# Patient Record
Sex: FEMALE | Race: WHITE
[De-identification: names, ages, dates, MRNs, and addresses within clinical notes are randomized per-mention and may not be internally consistent; named-entity substitution may affect disease eponyms.]

---

## 2017-08-28 ENCOUNTER — PRIOR ORIGINAL RECORDS (OUTPATIENT)
Dept: OTHER | Age: 62
End: 2017-08-28

## 2017-08-29 ENCOUNTER — MYAURORA ACCOUNT LINK (OUTPATIENT)
Dept: OTHER | Age: 62
End: 2017-08-29

## 2017-08-29 ENCOUNTER — PRIOR ORIGINAL RECORDS (OUTPATIENT)
Dept: OTHER | Age: 62
End: 2017-08-29

## 2017-09-05 ENCOUNTER — PRIOR ORIGINAL RECORDS (OUTPATIENT)
Dept: OTHER | Age: 62
End: 2017-09-05

## 2017-09-14 ENCOUNTER — HOSPITAL ENCOUNTER (OUTPATIENT)
Dept: CV DIAGNOSTICS | Facility: HOSPITAL | Age: 62
Discharge: HOME | End: 2017-09-14
Attending: INTERNAL MEDICINE
Payer: COMMERCIAL

## 2017-09-14 DIAGNOSIS — R07.9 CHEST PAIN: ICD-10-CM

## 2017-09-14 DIAGNOSIS — R06.00 DYSPNEA, UNSPECIFIED TYPE: ICD-10-CM

## 2017-09-14 PROCEDURE — 93350 STRESS TTE ONLY: CPT | Performed by: INTERNAL MEDICINE

## 2017-09-14 PROCEDURE — 93018 CV STRESS TEST I&R ONLY: CPT | Performed by: INTERNAL MEDICINE

## 2017-09-14 PROCEDURE — 93017 CV STRESS TEST TRACING ONLY: CPT | Performed by: INTERNAL MEDICINE

## 2017-10-04 ENCOUNTER — PRIOR ORIGINAL RECORDS (OUTPATIENT)
Dept: OTHER | Age: 62
End: 2017-10-04

## 2017-10-19 ENCOUNTER — PRIOR ORIGINAL RECORDS (OUTPATIENT)
Dept: OTHER | Age: 62
End: 2017-10-19

## 2018-12-06 ENCOUNTER — PRIOR ORIGINAL RECORDS (OUTPATIENT)
Dept: OTHER | Age: 63
End: 2018-12-06

## 2018-12-06 ENCOUNTER — MYAURORA ACCOUNT LINK (OUTPATIENT)
Dept: OTHER | Age: 63
End: 2018-12-06

## 2019-02-28 VITALS
HEIGHT: 69 IN | WEIGHT: 133 LBS | BODY MASS INDEX: 19.7 KG/M2 | DIASTOLIC BLOOD PRESSURE: 98 MMHG | SYSTOLIC BLOOD PRESSURE: 138 MMHG | HEART RATE: 80 BPM

## 2019-02-28 VITALS
WEIGHT: 135 LBS | SYSTOLIC BLOOD PRESSURE: 172 MMHG | DIASTOLIC BLOOD PRESSURE: 90 MMHG | BODY MASS INDEX: 20.46 KG/M2 | HEIGHT: 68 IN | HEART RATE: 60 BPM

## 2019-02-28 VITALS
HEART RATE: 58 BPM | DIASTOLIC BLOOD PRESSURE: 78 MMHG | WEIGHT: 136 LBS | HEIGHT: 68 IN | SYSTOLIC BLOOD PRESSURE: 142 MMHG | BODY MASS INDEX: 20.61 KG/M2

## 2019-02-28 VITALS
HEART RATE: 76 BPM | HEIGHT: 69 IN | SYSTOLIC BLOOD PRESSURE: 140 MMHG | WEIGHT: 145 LBS | BODY MASS INDEX: 21.48 KG/M2 | DIASTOLIC BLOOD PRESSURE: 84 MMHG

## 2019-05-30 ENCOUNTER — APPOINTMENT (OUTPATIENT)
Dept: CV DIAGNOSTICS | Facility: HOSPITAL | Age: 64
End: 2019-05-30
Attending: STUDENT IN AN ORGANIZED HEALTH CARE EDUCATION/TRAINING PROGRAM
Payer: COMMERCIAL

## 2019-05-30 ENCOUNTER — APPOINTMENT (OUTPATIENT)
Dept: GENERAL RADIOLOGY | Facility: HOSPITAL | Age: 64
End: 2019-05-30
Attending: EMERGENCY MEDICINE
Payer: COMMERCIAL

## 2019-05-30 ENCOUNTER — HOSPITAL ENCOUNTER (OUTPATIENT)
Facility: HOSPITAL | Age: 64
Setting detail: OBSERVATION
Discharge: HOME | End: 2019-05-31
Attending: EMERGENCY MEDICINE | Admitting: HOSPITALIST
Payer: COMMERCIAL

## 2019-05-30 ENCOUNTER — APPOINTMENT (OUTPATIENT)
Dept: CT IMAGING | Facility: HOSPITAL | Age: 64
End: 2019-05-30
Attending: EMERGENCY MEDICINE
Payer: COMMERCIAL

## 2019-05-30 DIAGNOSIS — R07.89 CHEST PAIN, ATYPICAL: Primary | ICD-10-CM

## 2019-05-30 DIAGNOSIS — I10 HYPERTENSION, UNSPECIFIED TYPE: ICD-10-CM

## 2019-05-30 PROBLEM — R73.9 HYPERGLYCEMIA: Status: ACTIVE | Noted: 2019-05-30

## 2019-05-30 PROBLEM — R79.89 AZOTEMIA: Status: ACTIVE | Noted: 2019-05-30

## 2019-05-30 PROCEDURE — 99219 INITIAL OBSERVATION CARE,LEVL II: CPT | Performed by: STUDENT IN AN ORGANIZED HEALTH CARE EDUCATION/TRAINING PROGRAM

## 2019-05-30 PROCEDURE — 71045 X-RAY EXAM CHEST 1 VIEW: CPT | Performed by: EMERGENCY MEDICINE

## 2019-05-30 PROCEDURE — 71275 CT ANGIOGRAPHY CHEST: CPT | Performed by: EMERGENCY MEDICINE

## 2019-05-30 PROCEDURE — 93306 TTE W/DOPPLER COMPLETE: CPT | Performed by: STUDENT IN AN ORGANIZED HEALTH CARE EDUCATION/TRAINING PROGRAM

## 2019-05-30 RX ORDER — SODIUM PHOSPHATE, DIBASIC AND SODIUM PHOSPHATE, MONOBASIC 7; 19 G/133ML; G/133ML
1 ENEMA RECTAL ONCE AS NEEDED
Status: DISCONTINUED | OUTPATIENT
Start: 2019-05-30 | End: 2019-05-31

## 2019-05-30 RX ORDER — METOPROLOL SUCCINATE 50 MG/1
50 TABLET, EXTENDED RELEASE ORAL
Status: DISCONTINUED | OUTPATIENT
Start: 2019-05-31 | End: 2019-05-31

## 2019-05-30 RX ORDER — ACETAMINOPHEN 325 MG/1
650 TABLET ORAL EVERY 6 HOURS PRN
Status: DISCONTINUED | OUTPATIENT
Start: 2019-05-30 | End: 2019-05-31

## 2019-05-30 RX ORDER — COLCHICINE 0.6 MG/1
1.2 TABLET ORAL ONCE
Status: COMPLETED | OUTPATIENT
Start: 2019-05-30 | End: 2019-05-30

## 2019-05-30 RX ORDER — METOCLOPRAMIDE HYDROCHLORIDE 5 MG/ML
10 INJECTION INTRAMUSCULAR; INTRAVENOUS EVERY 8 HOURS PRN
Status: DISCONTINUED | OUTPATIENT
Start: 2019-05-30 | End: 2019-05-31

## 2019-05-30 RX ORDER — ASPIRIN 81 MG/1
324 TABLET, CHEWABLE ORAL ONCE
Status: COMPLETED | OUTPATIENT
Start: 2019-05-30 | End: 2019-05-30

## 2019-05-30 RX ORDER — HYDRALAZINE HYDROCHLORIDE 20 MG/ML
5 INJECTION INTRAMUSCULAR; INTRAVENOUS EVERY 6 HOURS PRN
Status: DISCONTINUED | OUTPATIENT
Start: 2019-05-30 | End: 2019-05-31

## 2019-05-30 RX ORDER — ONDANSETRON 2 MG/ML
4 INJECTION INTRAMUSCULAR; INTRAVENOUS EVERY 6 HOURS PRN
Status: DISCONTINUED | OUTPATIENT
Start: 2019-05-30 | End: 2019-05-31

## 2019-05-30 RX ORDER — NITROGLYCERIN 0.4 MG/1
0.4 TABLET SUBLINGUAL EVERY 5 MIN PRN
Status: DISCONTINUED | OUTPATIENT
Start: 2019-05-30 | End: 2019-05-31

## 2019-05-30 RX ORDER — ASPIRIN 325 MG
325 TABLET, DELAYED RELEASE (ENTERIC COATED) ORAL DAILY
Status: DISCONTINUED | OUTPATIENT
Start: 2019-05-31 | End: 2019-05-31

## 2019-05-30 RX ORDER — ASPIRIN 325 MG
325 TABLET ORAL DAILY
Status: DISCONTINUED | OUTPATIENT
Start: 2019-05-30 | End: 2019-05-30 | Stop reason: SDUPTHER

## 2019-05-30 RX ORDER — LISINOPRIL 40 MG/1
40 TABLET ORAL DAILY
Status: DISCONTINUED | OUTPATIENT
Start: 2019-05-30 | End: 2019-05-30

## 2019-05-30 RX ORDER — AMOXICILLIN AND CLAVULANATE POTASSIUM 875; 125 MG/1; MG/1
1 TABLET, FILM COATED ORAL 2 TIMES DAILY
Status: DISCONTINUED | OUTPATIENT
Start: 2019-05-30 | End: 2019-05-31

## 2019-05-30 RX ORDER — LISINOPRIL 40 MG/1
40 TABLET ORAL DAILY
Status: DISCONTINUED | OUTPATIENT
Start: 2019-05-31 | End: 2019-05-31

## 2019-05-30 RX ORDER — AMOXICILLIN AND CLAVULANATE POTASSIUM 875; 125 MG/1; MG/1
1 TABLET, FILM COATED ORAL 2 TIMES DAILY
COMMUNITY
Start: 2019-05-23 | End: 2019-06-01

## 2019-05-30 RX ORDER — COLCHICINE 0.6 MG/1
0.6 TABLET ORAL DAILY
Status: DISCONTINUED | OUTPATIENT
Start: 2019-05-31 | End: 2019-05-30

## 2019-05-30 RX ORDER — SODIUM CHLORIDE 9 MG/ML
INJECTION, SOLUTION INTRAVENOUS CONTINUOUS
Status: DISCONTINUED | OUTPATIENT
Start: 2019-05-30 | End: 2019-05-30

## 2019-05-30 RX ORDER — POLYETHYLENE GLYCOL 3350 17 G/17G
17 POWDER, FOR SOLUTION ORAL DAILY PRN
Status: DISCONTINUED | OUTPATIENT
Start: 2019-05-30 | End: 2019-05-31

## 2019-05-30 RX ORDER — ENOXAPARIN SODIUM 100 MG/ML
40 INJECTION SUBCUTANEOUS DAILY
Status: DISCONTINUED | OUTPATIENT
Start: 2019-05-30 | End: 2019-05-31

## 2019-05-30 RX ORDER — ONDANSETRON 2 MG/ML
4 INJECTION INTRAMUSCULAR; INTRAVENOUS EVERY 4 HOURS PRN
Status: DISCONTINUED | OUTPATIENT
Start: 2019-05-30 | End: 2019-05-30

## 2019-05-30 RX ORDER — METOPROLOL SUCCINATE 50 MG/1
50 TABLET, EXTENDED RELEASE ORAL NIGHTLY
Status: DISCONTINUED | OUTPATIENT
Start: 2019-05-30 | End: 2019-05-30

## 2019-05-30 RX ORDER — BISACODYL 10 MG
10 SUPPOSITORY, RECTAL RECTAL
Status: DISCONTINUED | OUTPATIENT
Start: 2019-05-30 | End: 2019-05-31

## 2019-05-30 RX ORDER — INDOMETHACIN 25 MG/1
25 CAPSULE ORAL
Status: CANCELLED | OUTPATIENT
Start: 2019-05-30

## 2019-05-30 RX ORDER — COLCHICINE 0.6 MG/1
0.6 TABLET ORAL DAILY
Status: DISCONTINUED | OUTPATIENT
Start: 2019-05-30 | End: 2019-05-31

## 2019-05-30 NOTE — ED INITIAL ASSESSMENT (HPI)
Pt on abx for uri, has 3 days left, pt was in [facility] 2 weeks ago, states cp started this am, pain on inspiration

## 2019-05-30 NOTE — PLAN OF CARE
NURSING ADMISSION NOTE      Patient admitted via cart  Oriented to room. Safety precautions initiated. Bed in low position. Call light in reach.   Admission complete  Report given to RN

## 2019-05-30 NOTE — H&P
[facility]  History and Physical     Janee Parsons Patient Status:  Emergency    1955 MRN [id number]   Location 6[address] [facility] Attending Julio Fuentes MD   Hosp Day # 0 [facility] 1325 [facility]     Chief Compl Rfl: 11   lisinopril 40 MG Oral Tab Take 40 mg by mouth daily. Disp:  Rfl:        Review of Systems:   A comprehensive 14 point review of systems was completed. Pertinent positives and negatives noted in the HPI.     Physical Exam:    /83   Pulse indomethacin given HTN/ ?  Unstable angina, start colchisine       Quality:  · DVT Prophylaxis: Lovenox  · CODE status: full  · Joshua: no    Plan of care discussed with pt    Manny Ayoub MD  5/30/2019

## 2019-05-30 NOTE — PLAN OF CARE
Patient received from ER denies chest pain/discomfort at this time. Sinus rhythm on monitor. Will continue to monitor.     Problem: CARDIOVASCULAR - ADULT  Goal: Maintains optimal cardiac output and hemodynamic stability  Description  INTERVENTIONS:  - Mo

## 2019-05-31 ENCOUNTER — APPOINTMENT (OUTPATIENT)
Dept: CV DIAGNOSTICS | Facility: HOSPITAL | Age: 64
End: 2019-05-31
Attending: INTERNAL MEDICINE
Payer: COMMERCIAL

## 2019-05-31 VITALS
RESPIRATION RATE: 16 BRPM | BODY MASS INDEX: 21.3 KG/M2 | OXYGEN SATURATION: 100 % | SYSTOLIC BLOOD PRESSURE: 173 MMHG | HEIGHT: 69 IN | TEMPERATURE: 98 F | WEIGHT: 143.81 LBS | DIASTOLIC BLOOD PRESSURE: 97 MMHG | HEART RATE: 60 BPM

## 2019-05-31 PROCEDURE — 99217 OBSERVATION CARE DISCHARGE: CPT | Performed by: STUDENT IN AN ORGANIZED HEALTH CARE EDUCATION/TRAINING PROGRAM

## 2019-05-31 PROCEDURE — 93350 STRESS TTE ONLY: CPT | Performed by: INTERNAL MEDICINE

## 2019-05-31 PROCEDURE — 99225 SUBSEQUENT OBSERVATION CARE: CPT | Performed by: STUDENT IN AN ORGANIZED HEALTH CARE EDUCATION/TRAINING PROGRAM

## 2019-05-31 PROCEDURE — 93018 CV STRESS TEST I&R ONLY: CPT | Performed by: INTERNAL MEDICINE

## 2019-05-31 PROCEDURE — 93017 CV STRESS TEST TRACING ONLY: CPT | Performed by: INTERNAL MEDICINE

## 2019-05-31 PROCEDURE — 99244 OFF/OP CNSLTJ NEW/EST MOD 40: CPT | Performed by: INTERNAL MEDICINE

## 2019-05-31 RX ORDER — AMLODIPINE BESYLATE 5 MG/1
5 TABLET ORAL DAILY
Status: DISCONTINUED | OUTPATIENT
Start: 2019-05-31 | End: 2019-05-31

## 2019-05-31 RX ORDER — COLCHICINE 0.6 MG/1
TABLET ORAL
Qty: 10 TABLET | Refills: 0 | Status: SHIPPED | OUTPATIENT
Start: 2019-05-31

## 2019-05-31 RX ORDER — AMLODIPINE BESYLATE 5 MG/1
5 TABLET ORAL DAILY
Qty: 30 TABLET | Refills: 0 | Status: SHIPPED | OUTPATIENT
Start: 2019-05-31

## 2019-05-31 NOTE — PLAN OF CARE
A/O x 4. Denies chest pain and SOB  Room air. Tele NSR  Cardiac diet  Augmentin PO for sinus infection  AM labs  Up ad merna  Poss DC home  Will continue to monitor.     Problem: CARDIOVASCULAR - ADULT  Goal: Maintains optimal cardiac output and hemodynami

## 2019-05-31 NOTE — CONSULTS
[facility]  Cardiology Consultation    Nikolay Hinkle Patient Status:  Observation    1955 MRN [id number]   [facility] 8NE-A Attending Cb Sosa MD   Hosp Day # 0 [place] [id number] [facility]     Reason for Consultation:  Ilya Sheppard Metoclopramide HCl (REGLAN) injection 10 mg, 10 mg, Intravenous, Q8H PRN  •  PEG 3350 (MIRALAX) powder packet 17 g, 17 g, Oral, Daily PRN  •  magnesium hydroxide (MILK OF MAGNESIA) 400 MG/5ML suspension 30 mL, 30 mL, Oral, Daily PRN  •  bisacodyl (DULCOLAX bilat.   Neurologic: Alert and oriented, normal affect. Skin: Warm and dry.  No rash    Laboratory Data:  Lab Results   Component Value Date    WBC 7.6 05/31/2019    HGB 14.4 05/31/2019    HCT 42.6 05/31/2019    .0 05/31/2019    TSH 1.890 05/31/2019

## 2019-05-31 NOTE — PROGRESS NOTES
Pt is AOx4, denies chest pain or dyspnea. Room air. SR on tele. Up ad merna. Trop negative x3. Will monitor.

## 2019-05-31 NOTE — PLAN OF CARE
Patient is alert and oriented. Patient cleared for discharge home. IV removed. AVS reviewed with teach back method. Patient declined transport to car.

## 2019-05-31 NOTE — PROGRESS NOTES
[facility]  Progress Note     [facility] Patient Status:  Observation    1955 MRN [id number]   [facility] 8NE-A Attending Pita Frias MD   Hosp Day # 0 PCP Yoshi Girard     Chief Complaint: CP    S: Patient w/ Oral Daily   • Amoxicillin-Pot Clavulanate  1 tablet Oral BID   • enoxaparin  40 mg Subcutaneous Daily   • colchicine  0.6 mg Oral Daily   • Metoprolol Succinate ER  50 mg Oral Daily Beta Blocker   • lisinopril  40 mg Oral Daily   • aspirin  325 mg Oral Da

## 2019-05-31 NOTE — DISCHARGE SUMMARY
[facility]  DISCHARGE SUMMARY     Ely Wilder Patient Status:  Observation    1955 MRN [id number]   [facility] 8NE-A Attending Sina Lacy MD   Hosp Day # 0 [facility] 13[address] [facility]     Date of Admission: 2019  Da tablet  Refills:  0        CONTINUE taking these medications      Instructions Prescription details   AUGMENTIN 875-125 MG Tabs  Generic drug:  Amoxicillin-Pot Clavulanate      Take 1 tablet by mouth 2 (two) times daily.  For 10 days   Stop taking on:  6/1/ toe  Extremities: No edema.   -----------------------------------------------------------------------------------------------  PATIENT DISCHARGE INSTRUCTIONS: See electronic chart    Nahun Tena MD    Time spent:  > 30 minutes

## 2019-05-31 NOTE — PLAN OF CARE
Alert,oriented x3  Tele shows Sinus veronica 58/min  Denies chest pain  Ambulating in the room and in the collier  Denies dizziness  Claims gout is less pain ful and less swollen,left foot

## 2019-05-31 NOTE — HISTORICAL OFFICE NOTE
Julio Woodruff MD - 2017 12:00 AM CDT  FONT COLOR=\"#[id number]\">LAVINIA THO  : 1955  ACCOUNT: [id number]  [phone]  PCP:   TODAY'S DATE: 2017  DICTATED BY: Karissa Koch MD]    CHIEF COMPLAINT: [Followup of Hypertension, elevated and thyroid not enlarged. RESP: respirations with normal rate and rhythm and clear to auscultation and percussion. GI: no masses and no hepatosplenomegaly. LYMPHATIC: no lymphedema and nodes non-palpable.  MS: normal gait, normal muscle tone and no

## 2020-03-04 ENCOUNTER — TELEPHONE (OUTPATIENT)
Dept: CARDIOLOGY | Age: 65
End: 2020-03-04

## 2023-08-28 ENCOUNTER — TELEPHONE (OUTPATIENT)
Dept: ORTHOPEDICS CLINIC | Facility: CLINIC | Age: 68
End: 2023-08-28

## 2023-08-28 DIAGNOSIS — M25.572 LEFT ANKLE PAIN, UNSPECIFIED CHRONICITY: Primary | ICD-10-CM

## 2023-08-28 NOTE — TELEPHONE ENCOUNTER
Patient called for left ankle pain, no recent xrays. Please advise if xrays are needed.   Future Appointments   Date Time Provider Tali Queen   10/3/2023  9:30 AM Michael Arguello DPM [facility]

## 2023-10-04 ENCOUNTER — OFFICE VISIT (OUTPATIENT)
Dept: ORTHOPEDICS CLINIC | Facility: CLINIC | Age: 68
End: 2023-10-04
Payer: COMMERCIAL

## 2023-10-04 ENCOUNTER — HOSPITAL ENCOUNTER (OUTPATIENT)
Dept: GENERAL RADIOLOGY | Age: 68
Discharge: HOME | End: 2023-10-04
Attending: PODIATRIST
Payer: MEDICARE

## 2023-10-04 VITALS — BODY MASS INDEX: 21.98 KG/M2 | HEIGHT: 68 IN | WEIGHT: 145 LBS

## 2023-10-04 DIAGNOSIS — M25.572 LEFT ANKLE PAIN, UNSPECIFIED CHRONICITY: ICD-10-CM

## 2023-10-04 DIAGNOSIS — M76.829 POSTERIOR TIBIAL TENDON DYSFUNCTION: ICD-10-CM

## 2023-10-04 DIAGNOSIS — M77.9 TENDONITIS: ICD-10-CM

## 2023-10-04 DIAGNOSIS — M25.572 LEFT ANKLE PAIN, UNSPECIFIED CHRONICITY: Primary | ICD-10-CM

## 2023-10-04 DIAGNOSIS — M85.872 OSTEOPENIA OF LEFT ANKLE: ICD-10-CM

## 2023-10-04 PROCEDURE — 3008F BODY MASS INDEX DOCD: CPT | Performed by: PODIATRIST

## 2023-10-04 PROCEDURE — 73610 X-RAY EXAM OF ANKLE: CPT | Performed by: PODIATRIST

## 2023-10-04 PROCEDURE — 99204 OFFICE O/P NEW MOD 45 MIN: CPT | Performed by: PODIATRIST

## 2023-10-17 ENCOUNTER — TELEPHONE (OUTPATIENT)
Dept: PHYSICAL THERAPY | Facility: HOSPITAL | Age: 68
End: 2023-10-17

## 2023-10-18 ENCOUNTER — TELEPHONE (OUTPATIENT)
Dept: PHYSICAL THERAPY | Facility: HOSPITAL | Age: 68
End: 2023-10-18

## 2023-10-24 ENCOUNTER — TELEPHONE (OUTPATIENT)
Dept: PHYSICAL THERAPY | Facility: HOSPITAL | Age: 68
End: 2023-10-24

## 2023-10-25 ENCOUNTER — TELEPHONE (OUTPATIENT)
Dept: PHYSICAL THERAPY | Facility: HOSPITAL | Age: 68
End: 2023-10-25

## 2023-10-26 ENCOUNTER — OFFICE VISIT (OUTPATIENT)
Dept: PHYSICAL THERAPY | Age: 68
End: 2023-10-26
Attending: PODIATRIST

## 2023-10-26 DIAGNOSIS — M76.829 POSTERIOR TIBIAL TENDON DYSFUNCTION: ICD-10-CM

## 2023-10-26 DIAGNOSIS — M25.572 LEFT ANKLE PAIN, UNSPECIFIED CHRONICITY: Primary | ICD-10-CM

## 2023-10-26 DIAGNOSIS — M77.9 TENDONITIS: ICD-10-CM

## 2023-10-26 PROCEDURE — 97110 THERAPEUTIC EXERCISES: CPT | Performed by: PHYSICAL THERAPIST

## 2023-10-26 PROCEDURE — 97161 PT EVAL LOW COMPLEX 20 MIN: CPT | Performed by: PHYSICAL THERAPIST

## 2023-10-30 ENCOUNTER — OFFICE VISIT (OUTPATIENT)
Dept: PHYSICAL THERAPY | Age: 68
End: 2023-10-30
Attending: PODIATRIST

## 2023-10-30 PROCEDURE — 97140 MANUAL THERAPY 1/> REGIONS: CPT | Performed by: PHYSICAL THERAPIST

## 2023-10-30 PROCEDURE — 97110 THERAPEUTIC EXERCISES: CPT | Performed by: PHYSICAL THERAPIST

## 2023-10-30 NOTE — PROGRESS NOTES
Dx: Left ankle pain, unspecified chronicity (M25.572)  Posterior tibial tendon dysfunction (M76.829)  Tendonitis (M77.9)          Authorized # of Visits:  8         Next MD visit: none scheduled  Fall Risk: standard         Precautions: n/a           Medication Changes since last visit?: No  Subjective: doing HEP, some soreness in L foot continues but feels that exercises may be beneficial. Really liked the taping of L arch and purchased tape for self use. Objective: Reviewed HEP. Rx per grid. pt wearing self administered tape, this was removed and pt instructed in appropriate arch support taping. Date: 10/30/2023  Tx#: 2/8 Date: Tx#: 3/ Date: Tx#: 4/ Date: Tx#: 5/ Date: Tx#: 6/ Date: Tx#: 7/ Date: Tx#: 8/   Rec bike 6 min, L6         Rocker board 4 way x 40 ea         Heel raise x 20         Gastroc stretch 3x10\" ea         STM to L heel/ankle x 8 min         Jt mobs talocrural and tarsal x 4 min         Arch support taping x 5 min                                        Assessment: babar well, mod irritability of symptoms. Goals:   to be reached in 8 visits. Pt. will report decreased pain from 10/10 to 3/10 at worst.  Increase strength of L ankle PF to 4/5 to allow pt to ride stationary biek without pain, return to exercise regime. Pt. will be able to tolerate driving for 50 minutes without increased symptoms. LEISURE:  Pt will be able to return to previous level of function for leisure activities  Pt able to ambulate on level ground for 30 min without increased symptoms  Pt. will be independent with home exercise program and self management. Plan: pt to cont HEP, tape as needed until next visit.     Skilled Services: TE, MT    Charges: TE2, MT1       Total Timed Treatment: TE 28 min, MT 17 min  Total Treatment Time: 45 min

## 2023-11-01 ENCOUNTER — APPOINTMENT (OUTPATIENT)
Dept: PHYSICAL THERAPY | Age: 68
End: 2023-11-01
Attending: PODIATRIST
Payer: MEDICARE

## 2023-11-06 ENCOUNTER — OFFICE VISIT (OUTPATIENT)
Dept: PHYSICAL THERAPY | Age: 68
End: 2023-11-06
Attending: PODIATRIST
Payer: MEDICARE

## 2023-11-06 PROCEDURE — 97140 MANUAL THERAPY 1/> REGIONS: CPT | Performed by: PHYSICAL THERAPIST

## 2023-11-06 PROCEDURE — 97110 THERAPEUTIC EXERCISES: CPT | Performed by: PHYSICAL THERAPIST

## 2023-11-06 NOTE — PROGRESS NOTES
Dx: Left ankle pain, unspecified chronicity (M25.572)  Posterior tibial tendon dysfunction (M76.829)  Tendonitis (M77.9)          Authorized # of Visits:  8         Next MD visit: none scheduled  Fall Risk: standard         Precautions: n/a           Medication Changes since last visit?: No  Subjective: doing HEP, soreness in L foot continues unabated. Currently 7/10 weight bearing. Also with heel  and lateral ankle pain. Objective: Pt wearing self administered tape, does not feel that this is very beneficial anymore. Trial of more supportive taping and decreased intensity of exercises - pt feels no heel pain and decreased arch pain after. Date: 10/30/2023  Tx#: 2/8 Date: 11/6/23  Tx#: 3/8 Date: Tx#: 4/ Date: Tx#: 5/ Date: Tx#: 6/ Date: Tx#: 7/ Date: Tx#: 8/   Rec bike 6 min, L6 Rec bike 6 min, L6        Rocker board 4 way x 40 ea Rocker board 4 way x 40 ea        Heel raise x 20 -        Gastroc stretch 3x10\" ea Gastroc stretch 3x10\" ea        STM to L heel/ankle x 8 min STM to L heel/ankle x 8 min        Jt mobs talocrural and tarsal x 4 min Jt mobs talocrural and tarsal x 6 min        Arch support taping x 5 min Arch support taping x 5 min         Seated RTB PF 3x10                              Assessment: babar well, mod irritability of symptoms continues. Goals:   to be reached in 8 visits. Pt. will report decreased pain from 10/10 to 3/10 at worst.  Increase strength of L ankle PF to 4/5 to allow pt to ride stationary biek without pain, return to exercise regime. Pt. will be able to tolerate driving for 50 minutes without increased symptoms. LEISURE:  Pt will be able to return to previous level of function for leisure activities  Pt able to ambulate on level ground for 30 min without increased symptoms  Pt. will be independent with home exercise program and self management. Plan: pt to cont HEP, tape as needed until next visit, monitor skin to avoid irritation.     Skilled Services: TE, MT    Charges: TE2, MT1       Total Timed Treatment: TE 25 min, MT 19 min  Total Treatment Time: 44 min

## 2023-11-07 ENCOUNTER — PATIENT MESSAGE (OUTPATIENT)
Dept: ORTHOPEDICS CLINIC | Facility: CLINIC | Age: 68
End: 2023-11-07

## 2023-11-07 DIAGNOSIS — M25.572 LEFT ANKLE PAIN, UNSPECIFIED CHRONICITY: Primary | ICD-10-CM

## 2023-11-07 DIAGNOSIS — M76.829 POSTERIOR TIBIAL TENDON DYSFUNCTION: ICD-10-CM

## 2023-11-07 NOTE — TELEPHONE ENCOUNTER
Would an MRI be appropriate at this time, with all other thing she has tried thus far? Please advise, Thank you.

## 2023-11-07 NOTE — TELEPHONE ENCOUNTER
From: Jethro Heredia  To: Adrien Alpers Felske  Sent: 11/7/2023 10:13 AM CST  Subject: MRI cali Martin,  After PT, ankle taping, new shoes and custom orthotics, and calf wrap I'm not in a better pain relief place. Is the next step an MRI or ? Please let me know your thoughts.   Thanks,  Galen

## 2023-11-08 ENCOUNTER — OFFICE VISIT (OUTPATIENT)
Dept: PHYSICAL THERAPY | Age: 68
End: 2023-11-08
Attending: PODIATRIST
Payer: MEDICARE

## 2023-11-08 PROCEDURE — 97140 MANUAL THERAPY 1/> REGIONS: CPT | Performed by: PHYSICAL THERAPIST

## 2023-11-08 PROCEDURE — 97110 THERAPEUTIC EXERCISES: CPT | Performed by: PHYSICAL THERAPIST

## 2023-11-08 NOTE — PROGRESS NOTES
Dx: Left ankle pain, unspecified chronicity (M25.572)  Posterior tibial tendon dysfunction (M76.829)  Tendonitis (M77.9)          Authorized # of Visits:  8         Next MD visit: none scheduled  Fall Risk: standard         Precautions: n/a           Medication Changes since last visit?: No  Subjective: MD has ordered MRI, trying to get it scheduled. Currently 5/10 weight bearing. Continues to tape ankle with some relief. Objective: removed tape, we did not add tape today to allow skin to recover. Focus on Stm and mobs today, pt responds to manual treatment with less pain standing and walking after session; 1-2/10        Date: 10/30/2023  Tx#: 2/8 Date: 11/6/23  Tx#: 3/8 Date: 11/8/23  Tx#: 4/8 Date: Tx#: 5/ Date: Tx#: 6/ Date: Tx#: 7/ Date: Tx#: 8/   Rec bike 6 min, L6 Rec bike 6 min, L6 Rec bike 6 min, L6       Rocker board 4 way x 40 ea Rocker board 4 way x 40 ea Rocker board 4 way x 40 ea       Heel raise x 20 - Heel raise x 20       Gastroc stretch 3x10\" ea Gastroc stretch 3x10\" ea Gastroc stretch 3x10\" ea       STM to L heel/ankle x 8 min STM to L heel/ankle x 8 min STM to L heel/post ibx 14 min       Jt mobs talocrural and tarsal x 4 min Jt mobs talocrural and tarsal x 6 min Jt mobs talocrural and tarsal x 6 min       Arch support taping x 5 min Arch support taping x 5 min -        Seated RTB PF 3x10 -                             Assessment: babar well, mod irritability of symptoms continues. Goals:   to be reached in 8 visits. Pt. will report decreased pain from 10/10 to 3/10 at worst.  Increase strength of L ankle PF to 4/5 to allow pt to ride stationary biek without pain, return to exercise regime. Pt. will be able to tolerate driving for 50 minutes without increased symptoms.   LEISURE:  Pt will be able to return to previous level of function for leisure activities  Pt able to ambulate on level ground for 30 min without increased symptoms  Pt. will be independent with home exercise program and self management. Plan: pt to cont HEP, tape as needed, hold PT until furhter notice due to pending MRI.     Skilled Services: TE, MT    Charges: TE2, MT1       Total Timed Treatment: TE 20 min, MT 20 min  Total Treatment Time: 40 min

## 2023-11-08 NOTE — TELEPHONE ENCOUNTER
Sekou Espinosa., DPM   to [facility] Clinical Pool       11/7/23 10:53 AM  Yes pls enter mri left ankle

## 2023-11-30 ENCOUNTER — MED REC SCAN ONLY (OUTPATIENT)
Dept: ORTHOPEDICS CLINIC | Facility: CLINIC | Age: 68
End: 2023-11-30

## 2023-12-06 ENCOUNTER — OFFICE VISIT (OUTPATIENT)
Dept: ORTHOPEDICS CLINIC | Facility: CLINIC | Age: 68
End: 2023-12-06
Payer: MEDICARE

## 2023-12-06 DIAGNOSIS — M21.41 PES PLANUS OF BOTH FEET: ICD-10-CM

## 2023-12-06 DIAGNOSIS — M77.9 TENDONITIS: Primary | ICD-10-CM

## 2023-12-06 DIAGNOSIS — M21.079 ACQUIRED VALGUS DEFORMITY OF ANKLE: ICD-10-CM

## 2023-12-06 DIAGNOSIS — M67.80 TENDINOSIS: ICD-10-CM

## 2023-12-06 DIAGNOSIS — M76.829 POSTERIOR TIBIAL TENDON DYSFUNCTION: ICD-10-CM

## 2023-12-06 DIAGNOSIS — M21.42 PES PLANUS OF BOTH FEET: ICD-10-CM

## 2023-12-06 NOTE — PROGRESS NOTES
[facility] Progress Note    Subjective:   Lalita Betancourt is here for follow up and to go over her MRI results together. She continues to have pain despite PT, better shoes, rest.  Conchita Whittaker from PT- she has finished sessions of PT    Insoles from running [employer]  - [address] down the stairs - feels shooting pain right up P tib tendon      Objective:     Pain and swelling along posterior tibial course, mainly pain insertionally  Upon stance, collapse of left arch and ankle valgus  Weak PT tendon/dysfunction        Imaging: MRI findings from [facility]      IMPRESSION:   Posterior tibial tendinosis and tenosynovitis. Superomedial spring ligament degeneration. Peroneus brevis tendinosis and focal inframalleolar interstitial tear. Trace peroneal tenosynovitis. Mild patchy marrow edema involving the talar head and neck may be reactive or stress-related. No linear fracture identified. Talonavicular and tibiotalar joint effusions. Assessment/Plan:     Diagnoses and all orders for this visit:    Tendonitis    Tendinosis    Posterior tibial tendon dysfunction    Acquired valgus deformity of ankle    Pes planus of both feet    We looked at MRI study together and discussed the findings. The tendon does not have a tear but is at risk with the position of her foot and ankle as well as spring ligament involvement    Discussed PRP or viaflow stem cell based injection  Not a steroid injection as option      Custom next step- orthotics for custom support especially left side of collapse of arch and ankle valgus  Ankle brace - looked brace options on [address] for her to order that may stimulate the taping job that seemed to help, the tape irritates her skin so a brace will be a better option for her.     Discussed surgical options and what would be involved with that such as a subtalar joint fusion versus a calcaneal osteotomy and repair/tightening of the posterior tibial tendon. This would be several weeks nonweightbearing and she is not up for such a procedure at this time. Long-term she may need to consider an AFO device and we discussed what that is and what would be involved with an AFO      Stacey Candelaria. Ewa Leal DPM  [facility]    [employer] speech recognition software was used to prepare this note. If a word or phrase is confusing, it is likely do to a failure of recognition. Please contact me with any questions or clarifications.

## 2024-01-31 ENCOUNTER — HOSPITAL ENCOUNTER (OUTPATIENT)
Facility: HOSPITAL | Age: 69
Setting detail: OBSERVATION
Discharge: HOME | End: 2024-02-01
Attending: EMERGENCY MEDICINE | Admitting: HOSPITALIST
Payer: MEDICARE

## 2024-01-31 ENCOUNTER — APPOINTMENT (OUTPATIENT)
Dept: GENERAL RADIOLOGY | Facility: HOSPITAL | Age: 69
End: 2024-01-31
Attending: EMERGENCY MEDICINE
Payer: MEDICARE

## 2024-01-31 DIAGNOSIS — R55 SYNCOPE AND COLLAPSE: Primary | ICD-10-CM

## 2024-01-31 LAB
ALBUMIN SERPL-MCNC: 4 G/DL (ref 3.4–5)
ALBUMIN/GLOB SERPL: 1.1 {RATIO} (ref 1–2)
ALP LIVER SERPL-CCNC: 86 U/L
ALT SERPL-CCNC: 25 U/L
ANION GAP SERPL CALC-SCNC: 10 MMOL/L (ref 0–18)
AST SERPL-CCNC: 23 U/L (ref 15–37)
BASOPHILS # BLD AUTO: 0.08 X10(3) UL (ref 0–0.2)
BASOPHILS NFR BLD AUTO: 0.9 %
BILIRUB SERPL-MCNC: 0.6 MG/DL (ref 0.1–2)
BUN BLD-MCNC: 23 MG/DL (ref 9–23)
CALCIUM BLD-MCNC: 9.2 MG/DL (ref 8.5–10.1)
CHLORIDE SERPL-SCNC: 104 MMOL/L (ref 98–112)
CO2 SERPL-SCNC: 25 MMOL/L (ref 21–32)
CREAT BLD-MCNC: 0.99 MG/DL
EGFRCR SERPLBLD CKD-EPI 2021: 62 ML/MIN/1.73M2 (ref 60–?)
EOSINOPHIL # BLD AUTO: 0.4 X10(3) UL (ref 0–0.7)
EOSINOPHIL NFR BLD AUTO: 4.7 %
ERYTHROCYTE [DISTWIDTH] IN BLOOD BY AUTOMATED COUNT: 13.1 %
GLOBULIN PLAS-MCNC: 3.7 G/DL (ref 2.8–4.4)
GLUCOSE BLD-MCNC: 111 MG/DL (ref 70–99)
HCT VFR BLD AUTO: 41.8 %
HGB BLD-MCNC: 14.4 G/DL
IMM GRANULOCYTES # BLD AUTO: 0.06 X10(3) UL (ref 0–1)
IMM GRANULOCYTES NFR BLD: 0.7 %
LYMPHOCYTES # BLD AUTO: 3.23 X10(3) UL (ref 1–4)
LYMPHOCYTES NFR BLD AUTO: 38.2 %
MCH RBC QN AUTO: 31 PG (ref 26–34)
MCHC RBC AUTO-ENTMCNC: 34.4 G/DL (ref 31–37)
MCV RBC AUTO: 90.1 FL
MONOCYTES # BLD AUTO: 0.76 X10(3) UL (ref 0.1–1)
MONOCYTES NFR BLD AUTO: 9 %
NEUTROPHILS # BLD AUTO: 3.92 X10 (3) UL (ref 1.5–7.7)
NEUTROPHILS # BLD AUTO: 3.92 X10(3) UL (ref 1.5–7.7)
NEUTROPHILS NFR BLD AUTO: 46.5 %
OSMOLALITY SERPL CALC.SUM OF ELEC: 292 MOSM/KG (ref 275–295)
PLATELET # BLD AUTO: 239 10(3)UL (ref 150–450)
POTASSIUM SERPL-SCNC: 3.7 MMOL/L (ref 3.5–5.1)
PROT SERPL-MCNC: 7.7 G/DL (ref 6.4–8.2)
RBC # BLD AUTO: 4.64 X10(6)UL
SODIUM SERPL-SCNC: 139 MMOL/L (ref 136–145)
TROPONIN I SERPL HS-MCNC: 6 NG/L
WBC # BLD AUTO: 8.5 X10(3) UL (ref 4–11)

## 2024-01-31 PROCEDURE — 71045 X-RAY EXAM CHEST 1 VIEW: CPT | Performed by: EMERGENCY MEDICINE

## 2024-01-31 PROCEDURE — 99223 1ST HOSP IP/OBS HIGH 75: CPT | Performed by: HOSPITALIST

## 2024-02-01 ENCOUNTER — APPOINTMENT (OUTPATIENT)
Dept: CV DIAGNOSTICS | Facility: HOSPITAL | Age: 69
End: 2024-02-01
Attending: HOSPITALIST
Payer: MEDICARE

## 2024-02-01 VITALS
HEART RATE: 59 BPM | WEIGHT: 149.69 LBS | TEMPERATURE: 99 F | BODY MASS INDEX: 23.49 KG/M2 | HEIGHT: 67 IN | OXYGEN SATURATION: 96 % | DIASTOLIC BLOOD PRESSURE: 78 MMHG | SYSTOLIC BLOOD PRESSURE: 128 MMHG | RESPIRATION RATE: 16 BRPM

## 2024-02-01 PROBLEM — R00.1 BRADYCARDIA: Status: ACTIVE | Noted: 2024-02-01

## 2024-02-01 LAB
ANION GAP SERPL CALC-SCNC: 2 MMOL/L (ref 0–18)
ATRIAL RATE: 54 BPM
BUN BLD-MCNC: 25 MG/DL (ref 9–23)
CALCIUM BLD-MCNC: 9 MG/DL (ref 8.5–10.1)
CHLORIDE SERPL-SCNC: 111 MMOL/L (ref 98–112)
CO2 SERPL-SCNC: 28 MMOL/L (ref 21–32)
CREAT BLD-MCNC: 0.76 MG/DL
EGFRCR SERPLBLD CKD-EPI 2021: 86 ML/MIN/1.73M2 (ref 60–?)
ERYTHROCYTE [DISTWIDTH] IN BLOOD BY AUTOMATED COUNT: 13.2 %
GLUCOSE BLD-MCNC: 99 MG/DL (ref 70–99)
HCT VFR BLD AUTO: 38.5 %
HGB BLD-MCNC: 12.9 G/DL
MAGNESIUM SERPL-MCNC: 1.8 MG/DL (ref 1.6–2.6)
MCH RBC QN AUTO: 30.6 PG (ref 26–34)
MCHC RBC AUTO-ENTMCNC: 33.5 G/DL (ref 31–37)
MCV RBC AUTO: 91.2 FL
OSMOLALITY SERPL CALC.SUM OF ELEC: 296 MOSM/KG (ref 275–295)
P AXIS: 64 DEGREES
P-R INTERVAL: 190 MS
PHOSPHATE SERPL-MCNC: 3.5 MG/DL (ref 2.5–4.9)
PLATELET # BLD AUTO: 200 10(3)UL (ref 150–450)
POTASSIUM SERPL-SCNC: 4.3 MMOL/L (ref 3.5–5.1)
Q-T INTERVAL: 440 MS
QRS DURATION: 78 MS
QTC CALCULATION (BEZET): 417 MS
R AXIS: -10 DEGREES
RBC # BLD AUTO: 4.22 X10(6)UL
SODIUM SERPL-SCNC: 141 MMOL/L (ref 136–145)
T AXIS: 51 DEGREES
T4 FREE SERPL-MCNC: 0.9 NG/DL (ref 0.8–1.7)
TSI SER-ACNC: 4.12 MIU/ML (ref 0.36–3.74)
VENTRICULAR RATE: 54 BPM
WBC # BLD AUTO: 6.1 X10(3) UL (ref 4–11)

## 2024-02-01 PROCEDURE — 99239 HOSP IP/OBS DSCHRG MGMT >30: CPT | Performed by: HOSPITALIST

## 2024-02-01 PROCEDURE — 93306 TTE W/DOPPLER COMPLETE: CPT | Performed by: HOSPITALIST

## 2024-02-01 RX ORDER — POLYETHYLENE GLYCOL 3350 17 G/17G
17 POWDER, FOR SOLUTION ORAL DAILY PRN
Status: DISCONTINUED | OUTPATIENT
Start: 2024-02-01 | End: 2024-02-01

## 2024-02-01 RX ORDER — LISINOPRIL 40 MG/1
40 TABLET ORAL DAILY
Status: DISCONTINUED | OUTPATIENT
Start: 2024-02-01 | End: 2024-02-01

## 2024-02-01 RX ORDER — BENZONATATE 100 MG/1
200 CAPSULE ORAL 3 TIMES DAILY PRN
Status: DISCONTINUED | OUTPATIENT
Start: 2024-02-01 | End: 2024-02-01

## 2024-02-01 RX ORDER — ACETAMINOPHEN 500 MG
500 TABLET ORAL EVERY 4 HOURS PRN
Status: DISCONTINUED | OUTPATIENT
Start: 2024-02-01 | End: 2024-02-01

## 2024-02-01 RX ORDER — MAGNESIUM OXIDE 400 MG/1
400 TABLET ORAL ONCE
Status: COMPLETED | OUTPATIENT
Start: 2024-02-01 | End: 2024-02-01

## 2024-02-01 RX ORDER — AMLODIPINE BESYLATE 10 MG/1
10 TABLET ORAL DAILY
COMMUNITY

## 2024-02-01 RX ORDER — SENNOSIDES 8.6 MG
17.2 TABLET ORAL NIGHTLY PRN
Status: DISCONTINUED | OUTPATIENT
Start: 2024-02-01 | End: 2024-02-01

## 2024-02-01 RX ORDER — MELATONIN
3 NIGHTLY PRN
Status: DISCONTINUED | OUTPATIENT
Start: 2024-02-01 | End: 2024-02-01

## 2024-02-01 RX ORDER — ENOXAPARIN SODIUM 100 MG/ML
40 INJECTION SUBCUTANEOUS NIGHTLY
Status: DISCONTINUED | OUTPATIENT
Start: 2024-02-01 | End: 2024-02-01

## 2024-02-01 RX ORDER — ECHINACEA PURPUREA EXTRACT 125 MG
1 TABLET ORAL
Status: DISCONTINUED | OUTPATIENT
Start: 2024-02-01 | End: 2024-02-01

## 2024-02-01 RX ORDER — ONDANSETRON 2 MG/ML
8 INJECTION INTRAMUSCULAR; INTRAVENOUS EVERY 6 HOURS PRN
Status: DISCONTINUED | OUTPATIENT
Start: 2024-02-01 | End: 2024-02-01

## 2024-02-01 RX ORDER — AMLODIPINE BESYLATE 5 MG/1
10 TABLET ORAL DAILY
Status: DISCONTINUED | OUTPATIENT
Start: 2024-02-01 | End: 2024-02-01

## 2024-02-01 RX ORDER — BISACODYL 10 MG
10 SUPPOSITORY, RECTAL RECTAL
Status: DISCONTINUED | OUTPATIENT
Start: 2024-02-01 | End: 2024-02-01

## 2024-02-01 NOTE — PLAN OF CARE
NURSING ADMISSION NOTE      Patient admitted via Cart  Oriented to room.  Safety precautions initiated.  Bed in low position.  Call light in reach.    Assumed care at 0030. Pt is A&Ox4. Pt is on RA, O2 sats WNL. NSR on tele, VSS. Continent of B&B. Denies pain at this time. Up w/ SBA, tolerating well. Plan of care reviewed with patient, verbalizes understanding, all needs addressed at this time, pt seems to be resting comfortably. Call light within reach.    POC: 2D echo, Cardiology to see    Problem: CARDIOVASCULAR - ADULT  Goal: Maintains optimal cardiac output and hemodynamic stability  Description: INTERVENTIONS:  - Monitor vital signs, rhythm, and trends  - Monitor for bleeding, hypotension and signs of decreased cardiac output  - Evaluate effectiveness of vasoactive medications to optimize hemodynamic stability  - Monitor arterial and/or venous puncture sites for bleeding and/or hematoma  - Assess quality of pulses, skin color and temperature  - Assess for signs of decreased coronary artery perfusion - ex. Angina  - Evaluate fluid balance, assess for edema, trend weights  Outcome: Progressing  Goal: Absence of cardiac arrhythmias or at baseline  Description: INTERVENTIONS:  - Continuous cardiac monitoring, monitor vital signs, obtain 12 lead EKG if indicated  - Evaluate effectiveness of antiarrhythmic and heart rate control medications as ordered  - Initiate emergency measures for life threatening arrhythmias  - Monitor electrolytes and administer replacement therapy as ordered  Outcome: Progressing     Problem: NEUROLOGICAL - ADULT  Goal: Achieves stable or improved neurological status  Description: INTERVENTIONS  - Assess for and report changes in neurological status  - Initiate measures to prevent increased intracranial pressure  - Maintain blood pressure and fluid volume within ordered parameters to optimize cerebral perfusion and minimize risk of hemorrhage  - Monitor temperature, glucose, and sodium.  Initiate appropriate interventions as ordered  Outcome: Progressing

## 2024-02-01 NOTE — ED QUICK NOTES
Rounding Completed    Plan of Care reviewed. Waiting for clean inpatient bed assignment.  Elimination needs assessed.  Provided comfort measures.    Bed is locked and in lowest position. Call light within reach.

## 2024-02-01 NOTE — ED QUICK NOTES
Orders for admission, patient is aware of plan and ready to go upstairs. Any questions, please call ED RN Laura at extension [phone].     Patient Covid vaccination status: Partially vaccinated     COVID Test Ordered in ED: None    COVID Suspicion at Admission: N/A    Running Infusions:  None    Mental Status/LOC at time of transport: A/O X4    Other pertinent information: Patient's wife at the bedside.  CIWA score: N/A   NIH score:  N/A

## 2024-02-01 NOTE — CONSULTS
[facility]  Consultation    Liz Cronin Patient Status:  Observation    1955 MRN [id number]   [facility] 2NE-A Attending Artie Raphael MD   Hosp Day # 0 PCP Myriam Fenton,        Reason for consultation;  Syncope     HPI:  This is a 68 year old female patient with PMH of HTN who presented yesterday and an episode of syncope. She had visited with her PCP earlier in the day, and was feeling well. At night, she was having dinner and some wine when she suddenly felt \"off\", and had LOC afterwards. Her wife then brought her to the floor, and delivered compressions for less than a minute because she couldn't feel a pulse in the beginning, but the patient became more reponsive.    Currently she feels back to normal. Her chest is a little sore. She reports no prior CV Hx. She does note HR of 30s-40s per watch at night. She exercises almost every day without angina.    MDM / Diagnostics reviewed & interpreted:  ECG shows sinus bradycardia  Tele showing sinus bradycardia, and frequent PACs    Assessment:    Syncope  HTN    Plan:  -Low suspicion for ACS or true SCA based on story. Vasovagal syncope (or possibly bradycardia mediated syncope) would be more likely. Pt takes metoprolol at home for HTN, and has sinus bradycardia at baseline, though it had been asymptomatic in the past.  -obtain TTE  -If echo is reassuring, would DC BB, and arrange for MCT x 2 weeks on outpt basis. Her tele shows frequent PACs which do put her at increased risk for Afib., but so far there has been no evidence of significant arrhythmia.     Discussed with patient.     Please call with questions. Thank you for your consult.    Level of care: C5      Kai Dan MD  Interventional Cardiology  [facility]    --------------------------------------------------------------------------------------------------------------------------------  ROS 10 systems reviewed, pertinent findings  above.    History:  Past Medical History:   Diagnosis Date    Unspecified essential hypertension      Past Surgical History:   Procedure Laterality Date    TOTAL ABDOM HYSTERECTOMY       Family History   Problem Relation Age of Onset    Hypertension Father     Hypertension Mother       reports that she has never smoked. She has never used smokeless tobacco. She reports current alcohol use. She reports that she does not use drugs.    Objective:   Temp: 99.4 °F (37.4 °C)  Pulse: 65  Resp: 18  BP: 133/72    Intake/Output:     Intake/Output Summary (Last 24 hours) at 2/1/2024 0944  Last data filed at 2/1/2024 0850  Gross per 24 hour   Intake 1480 ml   Output 0 ml   Net 1480 ml       Physical Exam:     General: NAD. Conversant.   HEENT: Normocephalic, atraumatic.  Neck: Supple. No JVD.  Cardiac: RRR. S1, S2 normal. No murmur.  Lungs: GAEB, no wheezing.  GI: Soft, no visible organomegaly.  Extremities: Radial pulses 2+ bilaterally. No edema.  Skin: Warm and dry.      Kai Dan MD  2/1/2024  9:44 AM

## 2024-02-01 NOTE — ED INITIAL ASSESSMENT (HPI)
Pt was having dinner, talking to spouse, stated that she \"felt off\" does not remember having syncopal episode but medics reported that patient did have +LOC, unsure if patient hit head. Patient states she feels pretty good now.   Was at pcp at 1300, felt fine while at pcp office.

## 2024-02-01 NOTE — PROGRESS NOTES
NURSING DISCHARGE NOTE    Discharged Home via Wheelchair.  Accompanied by RN  Belongings Taken by patient/family.    Patient discharged to home. Discharge instructions given to and understood by patient. Appointments reviewed. All questions answered. Patient left unit with all belongings and in no signs or symptoms of distress.

## 2024-02-01 NOTE — H&P
[facility]     Liz Cronin Patient Status:  Emergency    1955 MRN [id number]   Location [facility] EMERGENCY DEPARTMENT Attending No att. providers found   Hosp Day # 0 PCP Myriam Fenton,      Chief Complaint: syncope    Subjective:    History of Present Illness:     Liz Cronin is a 68 year old female with syncope.  Had dinner with her wife tonight and a glass of wine.  She was sitting at table when she suddenly felt lightheaded for a second or two and then became unresponsive.  Her wife said she couldn't feel a pulse and patient appeared not to be breathing.  She called 911 and was coached through about 30 compressions of CPR, at which point she noticed that liz was starting to come to/wake up.  She is not sure if she was unresponsive the entire CPR cycle or not, as she was under stress doing CPR and not certain if she was rousing or not.  However, after 30 cpr compressiosn, liz was waking up.  Patient remembers being in ambulance but not any of the events at home.  No obvious seizure activity and patient had felt fine earlier in day, even having one to her PCP.  She does wake up aroudn 3am somewhat commonly though because she can feel some sort of palpiation in her chest, whereby she checks her aura ring and it will typically show bradycardia in the 30s.  However, no recent dizziness or orthostasis prior to this syncope event tonight, which occrured while seated.  She does exercise a lot and is on BB.    History/Other:    Past Medical History:  Past Medical History:   Diagnosis Date    Unspecified essential hypertension      Past Surgical History:   Past Surgical History:   Procedure Laterality Date    TOTAL ABDOM HYSTERECTOMY        Family History:   Family History   Problem Relation Age of Onset    Hypertension Father     Hypertension Mother        hypertension Social History:    reports that she has never smoked. She has never used  smokeless tobacco. She reports current alcohol use. She reports that she does not use drugs.     Allergies:   Allergies   Allergen Reactions    Pine     Ragweed        Medications:    No current facility-administered medications on file prior to encounter.     Current Outpatient Medications on File Prior to Encounter   Medication Sig Dispense Refill    BIOTIN OR Take 1 capsule by mouth daily.      Cholecalciferol (VITAMIN D OR) Take 1 tablet by mouth daily.      Omega-3 Fatty Acids (OMEGA-3 1450 OR) Take 1 capsule by mouth daily.      Metoprolol Succinate ER (TOPROL XL) 50 MG Oral Tablet 24 Hr Take 1 tablet (50 mg total) by mouth nightly. 30 tablet 11    lisinopril 40 MG Oral Tab Take 1 tablet (40 mg total) by mouth daily.         Review of Systems:   A comprehensive 12 point review of systems was completed.    Pertinent positives and negatives noted in the HPI.    Objective:   Physical Exam:    /65   Pulse 67   Temp 97.7 °F (36.5 °C) (Oral)   Resp 19   Ht 5' 7\" (1.702 m)   Wt 140 lb (63.5 kg)   LMP 05/20/2013   SpO2 96%   BMI 21.93 kg/m²   General: No acute distress, Alert  Respiratory: No rhonchi, no wheezes  Cardiovascular: S1, S2. Regular rate and rhythm  Abdomen: Soft, NT/ND, +BS  Neuro: No new focal deficits  Extremities: No edema      Results:    Labs:      Labs Last 24 Hours:    Recent Labs   Lab 01/31/24 1951   RBC 4.64   HGB 14.4   HCT 41.8   MCV 90.1   MCH 31.0   MCHC 34.4   RDW 13.1   NEPRELIM 3.92   WBC 8.5   .0       Recent Labs   Lab 01/31/24 1951   *   BUN 23   CREATSERUM 0.99   EGFRCR 62   CA 9.2   ALB 4.0      K 3.7      CO2 25.0   ALKPHO 86   AST 23   ALT 25   BILT 0.6   TP 7.7       Lab Results   Component Value Date    INR 0.98 05/30/2019       Recent Labs   Lab 01/31/24 1951   TROPHS 6       No results for input(s): \"TROP\", \"PBNP\" in the last 168 hours.    No results for input(s): \"PCT\" in the last 168 hours.    Imaging: Imaging data reviewed in  Epic.    Assessment & Plan:      #syncope; suspect.  Unclear if had vagal event, compounded by presence of BB.  However, somewhat concerning that her wife had to do CPR on patient; unclear if was true cardiac arrest or not.  Check echo and ask cards to evaluate; hold BB for now.  Might end up needing event monitor and ischemic w/u, though my suspicion for symptomatic CAD is rather low;  check TSH.    #HTN-hold BB, CCB, and acei for now;  if BP starts rising, would continue to probably off BB and would instead restart amlodipine and/or acei    Will do med rec once review complete.    Quality:  DVT prophylaxis:  SCDs, Lovenox  Code Status: full  Joshua: NO  Joshua Duration (in days): N/A  Central line: NO  Estimated discharge date: tbd    Plan of care discussed with patient and ER MD Akash Barry MD    Supplementary Documentation:

## 2024-02-01 NOTE — ED QUICK NOTES
Bedside report provided to Pebbles LOGAN RN. Patient and patient care partner state that patient was wearing an apple watch upon arrival but now cannot find it. Patient stated \"its just bothering me because its a thousand dollar watch.\" This RN and Pebbles HOBBS RN searched through patient blankets and found patients apple watch. Patient's apple watch was underneath patient. Apple watch placed on patients right wrist at this time.

## 2024-02-01 NOTE — ED QUICK NOTES
Rounding Completed    Plan of Care reviewed. Waiting for lab results.  Elimination needs assessed.  Provided comfort measures.    Bed is locked and in lowest position. Call light within reach.

## 2024-02-01 NOTE — ED PROVIDER NOTES
Patient Seen in: [facility] Emergency Department      History     Chief Complaint   Patient presents with    Syncope     Stated Complaint: syncope    Subjective:   HPI    Presents after a syncopal episode.  The patient was sitting at dinner with her wife when she stated that she did not feel right.  She reports that she had felt normal throughout the previous part of the day.  She had been drinking wine and eating pizza prior to this happening.  She states that the last thing that she remembers until she came around in the ambulance.  Her wife states that she complained of not feeling well and then she could see that she was going limp.  She went to support her so she would not fall out of the chair.  She could not feel a pulse and did not feel like the patient was breathing.  She states her color looked sort of gray.  She lost control of her bladder on the chair.  She called 911 with her other hand and they instructed her delayed the patient down and started CPR.  She states that she gave 30 compressions and then as she was going to potentially give her rescue breath the patient started breathing.  She seemed to come around but does not remember anything about being at home.  Medics found her to have stable vital signs and she was not hypoglycemic.  The patient had what sounds like a vasovagal syncopal episode last March at the dermatologist when she was getting a lesion removed.  She has not had any recent medication changes.  There was no overt seizure activity.    Objective:   Past Medical History:   Diagnosis Date    Unspecified essential hypertension               Past Surgical History:   Procedure Laterality Date    TOTAL ABDOM HYSTERECTOMY                  Social History     Socioeconomic History    Marital status: Single   Tobacco Use    Smoking status: Never    Smokeless tobacco: Never   Substance and Sexual Activity    Alcohol use: Yes    Drug use: No              Review of Systems    Positive for  stated complaint: syncope  Other systems are as noted in HPI.  Constitutional and vital signs reviewed.      All other systems reviewed and negative except as noted above.    Physical Exam     ED Triage Vitals [01/31/24 1954]   /77   Pulse 57   Resp 18   Temp 97.7 °F (36.5 °C)   Temp src Oral   SpO2 98 %   O2 Device None (Room air)       Current:/72   Pulse 63   Temp 97.7 °F (36.5 °C) (Oral)   Resp 18   Ht 170.2 cm (5' 7\")   Wt 63.5 kg   LMP 05/20/2013   SpO2 96%   BMI 21.93 kg/m²         Physical Exam    General: Alert and oriented x3 in no acute distress, slightly pale and fatigued.  HEENT: Normocephalic, atraumatic, pupils equal round and reactive to light.  Neck: Supple.  Cardiovascular: Regular rate and rhythm, no murmurs.  Respiratory: Lungs clear to auscultation.  Abdomen: Soft, nontender, no rebound or guarding, normal active bowel sounds, no CVA tenderness.  Extremities: No CCE.  Skin: Warm and dry.    ED Course     Labs Reviewed   COMP METABOLIC PANEL (14) - Abnormal; Notable for the following components:       Result Value    Glucose 111 (*)     All other components within normal limits   TROPONIN I HIGH SENSITIVITY - Normal   CBC WITH DIFFERENTIAL WITH PLATELET    Narrative:     The following orders were created for panel order CBC With Differential With Platelet.  Procedure                               Abnormality         Status                     ---------                               -----------         ------                     CBC W/ DIFFERENTIAL[[id number]]                              Final result                 Please view results for these tests on the individual orders.   RAINBOW DRAW LAVENDER   RAINBOW DRAW LIGHT GREEN   RAINBOW DRAW BLUE   CBC W/ DIFFERENTIAL     EKG    Rate, intervals and axes as noted on EKG Report.  Rate: 54  Rhythm: Sinus bradycardia  Reading: No acute ischemic abnormality         I personally reviewed the chest films and no pulmonary edema, rib  fracture or pneumothorax noted.        XR CHEST AP PORTABLE  (CPT=71045)    Result Date: 1/31/2024  PROCEDURE:  XR CHEST AP PORTABLE  (CPT=71045)  TECHNIQUE:  AP chest radiograph was obtained.  COMPARISON:  [address], XR, CHEST PA   LATERAL, 9/21/2012, 10:54 AM.  [facility] , XR, XR CHEST AP PORTABLE  (CPT=71045), 5/30/2019, 12:12 PM.  INDICATIONS:  syncope  PATIENT STATED HISTORY: (As transcribed by Technologist)  Patient offered no additional history at this time.    FINDINGS:  Magnified heart.  Normal pulmonary vascularity.  Tortuous aorta.  No focal pulmonary opacity.            CONCLUSION:  No evidence of active cardiopulmonary disease.   LOCATION:  [facility] ([place])      Dictated by (CST): Stevie Hayden MD on 1/31/2024 at 8:42 PM     Finalized by (CST): Stevie Hayden MD on 1/31/2024 at 8:43 PM        Medications   sodium chloride 0.9 % IV bolus 1,000 mL (0 mL Intravenous Stopped 1/31/24 2152)     Patient was given a bolus of normal saline given her syncopal episode and fogginess.  She has had mild bradycardia but has had an adequate blood pressure and been in a sinus rhythm.       MDM      Patient presents after a syncopal episode.  Differential diagnosis includes but is not limited to arrhythmia, myocardial infarction and dehydration.  The patient's event was significant for her her spouses report.  She may have actually had a cardiopulmonary arrest.  She was conscious and with a strong pulse when medics arrived.  The etiology of the symptoms is unclear.  The patient had very little warning of the symptoms and had a normal day otherwise.  She has been bradycardic and states that her aura ring has been showing that she is getting very bradycardic in the early morning and it is waking her up.  She is on 2 rate controlling agents and that may have contributed to her symptoms.  The possibility of arrhythmia remains.  She does not have ischemic changes on her EKG and her initial cardiac enzymes have come back negative.  She  also has a normal CBC and chemistry.  She will be admitted for further observation and possible cardiac workup.  I do not suspect a seizure given the description of the events.  I discussed her case with Dr. Souza from the hospitalist service who agrees with admission and has accepted her.  Patient and wife are in agreement with the plan.    Admission disposition: 1/31/2024 10:02 PM                                Medical Decision Making      Disposition and Plan     Clinical Impression:  1. Syncope and collapse         Disposition:  Admit  1/31/2024 10:02 pm    Follow-up:  No follow-up provider specified.        Medications Prescribed:  Current Discharge Medication List                            Hospital Problems       Present on Admission  Date Reviewed: 12/6/2023            ICD-10-CM Noted POA    * (Principal) Syncope and collapse R55 1/31/2024 Unknown

## 2024-02-01 NOTE — ED QUICK NOTES
Rounding Completed    Plan of Care reviewed. Waiting for lab results.  Elimination needs assessed.  Provided warm blanket.    Bed is locked and in lowest position. Call light within reach.

## 2024-02-01 NOTE — DISCHARGE SUMMARY
[place] [facility]  DISCHARGE SUMMARY     Liz Cronin Patient Status:  Observation    1955 MRN [id number]   [facility] 2NE-A Attending Artie Raphael MD   Hosp Day # 0 PCP Myriam Fenton DO     Date of Admission: 2024  Date of Discharge:   24    Discharge Disposition: Home or Self Care    Discharge Diagnosis:  #syncope; suspect.  Unclear if had vagal event, compounded by presence of BB.  doubt true cardiac arrest. hold BB for now.     #HTN    History of Present Illness: Liz Cronin is a 68 year old female with syncope.  Had dinner with her wife tonight and a glass of wine.  She was sitting at table when she suddenly felt lightheaded for a second or two and then became unresponsive.  Her wife said she couldn't feel a pulse and patient appeared not to be breathing.  She called 911 and was coached through about 30 compressions of CPR, at which point she noticed that liz was starting to come to/wake up.  She is not sure if she was unresponsive the entire CPR cycle or not, as she was under stress doing CPR and not certain if she was rousing or not.  However, after 30 cpr compressiosn, liz was waking up.  Patient remembers being in ambulance but not any of the events at home.  No obvious seizure activity and patient had felt fine earlier in day, even having one to her PCP.  She does wake up aroudn 3am somewhat commonly though because she can feel some sort of palpiation in her chest, whereby she checks her aura ring and it will typically show bradycardia in the 30s.  However, no recent dizziness or orthostasis prior to this syncope event tonight, which occrured while seated.  She does exercise a lot and is on BB.     Brief Synopsis: pt w/ syncope. Noted drops in HR to 30's on her smart watch. Bb dc'ed. HR improved in house and feels fine. Ok to DC home. Cards to follow w/ MCT x 2 weeks.     Lace+ Score: 37  59-90 High Risk  29-58 Medium Risk  0-28   Low Risk        TCM Follow-Up Recommendation:  LACE 29-58: Moderate Risk of readmission after discharge from the hospital.      Procedures during hospitalization:   none    Incidental or significant findings and recommendations (brief descriptions):  none    Lab/Test results pending at Discharge:   none    Consultants:  cards    Discharge Medication List:     Discharge Medications        CONTINUE taking these medications        Instructions Prescription details   amLODIPine 10 MG Tabs  Commonly known as: Norvasc      Take 1 tablet (10 mg total) by mouth daily.   Refills: 0     BIOTIN OR      Take 1 capsule by mouth daily.   Refills: 0     lisinopril 40 MG Tabs  Commonly known as: Prinivil; Zestril      Take 1 tablet (40 mg total) by mouth daily.   Refills: 0     OMEGA-3 1450 OR      Take 1 capsule by mouth daily.   Refills: 0     VITAMIN D OR      Take 1 tablet by mouth daily.   Refills: 0            STOP taking these medications      Toprol XL 50 MG Tb24  Generic drug: metoprolol succinate ER                 [place]    Follow-up appointment:   Kai Dan MD  [address] [address] [phone]    Follow up in 2 week(s)  office will call to schedule MCT heart monitor and  follow up    Myriam Fenton DO  [address] [place] [address]  [address]  [facility] [address] [phone]    Schedule an appointment as soon as possible for a visit      Appointments for Next 30 Days 2/1/2024 - 3/2/2024      None            Vital signs:  Temp:  [97.7 °F (36.5 °C)-99.4 °F (37.4 °C)] 98.8 °F (37.1 °C)  Pulse:  [57-73] 59  Resp:  [12-19] 16  BP: ()/(56-79) 128/78  SpO2:  [92 %-99 %] 96 %    Physical Exam:    General: No acute distress   Lungs: clear to auscultation  Cardiovascular: S1, S2  Abdomen: Soft      -----------------------------------------------------------------------------------------------  PATIENT DISCHARGE INSTRUCTIONS: See electronic chart    Artie Raphael MD    Total time spent  on discharge plannin minutes     The  [employer] Cures Act makes medical notes like these available to patients in the interest of transparency. Please be advised this is a medical document. Medical documents are intended to carry relevant information, facts as evident, and the clinical opinion of the practitioner. The medical note is intended as peer to peer communication and may appear blunt or direct. It is written in medical language and may contain abbreviations or verbiage that are unfamiliar.

## 2024-02-01 NOTE — PLAN OF CARE
Patient alert and oriented x 4. Up with standby assist. On RA. NSR on tele. Continent of bowel and bladder. Complaints of mild mid chest pain, patient declined pain medication at this time. No complaints shortness of breath. Cardiology to see. POC discussed with patient. Fall precautions in place. Call light within reach.     Problem: CARDIOVASCULAR - ADULT  Goal: Maintains optimal cardiac output and hemodynamic stability  Description: INTERVENTIONS:  - Monitor vital signs, rhythm, and trends  - Monitor for bleeding, hypotension and signs of decreased cardiac output  - Evaluate effectiveness of vasoactive medications to optimize hemodynamic stability  - Monitor arterial and/or venous puncture sites for bleeding and/or hematoma  - Assess quality of pulses, skin color and temperature  - Assess for signs of decreased coronary artery perfusion - ex. Angina  - Evaluate fluid balance, assess for edema, trend weights  Outcome: Progressing  Goal: Absence of cardiac arrhythmias or at baseline  Description: INTERVENTIONS:  - Continuous cardiac monitoring, monitor vital signs, obtain 12 lead EKG if indicated  - Evaluate effectiveness of antiarrhythmic and heart rate control medications as ordered  - Initiate emergency measures for life threatening arrhythmias  - Monitor electrolytes and administer replacement therapy as ordered  Outcome: Progressing     Problem: NEUROLOGICAL - ADULT  Goal: Achieves stable or improved neurological status  Description: INTERVENTIONS  - Assess for and report changes in neurological status  - Initiate measures to prevent increased intracranial pressure  - Maintain blood pressure and fluid volume within ordered parameters to optimize cerebral perfusion and minimize risk of hemorrhage  - Monitor temperature, glucose, and sodium. Initiate appropriate interventions as ordered  Outcome: Progressing

## 2024-02-02 NOTE — PAYOR COMM NOTE
Discharge Notification    Patient Name: LAVINIA NETTLES  Payor: [employer]  Subscriber #: [id number]  Authorization Number: N/A  Admit Date/Time: 1/31/2024 7:49 PM  Discharge Date/Time: 2/1/2024 5:00 PM    OBSERVATION

## 2024-04-14 ENCOUNTER — HOSPITAL ENCOUNTER (EMERGENCY)
Facility: HOSPITAL | Age: 69
Discharge: HOME | End: 2024-04-15
Attending: EMERGENCY MEDICINE
Payer: MEDICARE

## 2024-04-14 VITALS
WEIGHT: 145 LBS | RESPIRATION RATE: 18 BRPM | TEMPERATURE: 97 F | OXYGEN SATURATION: 97 % | SYSTOLIC BLOOD PRESSURE: 119 MMHG | DIASTOLIC BLOOD PRESSURE: 73 MMHG | HEIGHT: 68 IN | BODY MASS INDEX: 21.98 KG/M2 | HEART RATE: 66 BPM

## 2024-04-14 DIAGNOSIS — S01.112A LEFT EYELID LACERATION, INITIAL ENCOUNTER: Primary | ICD-10-CM

## 2024-04-14 PROCEDURE — 12013 RPR F/E/E/N/L/M 2.6-5.0 CM: CPT

## 2024-04-14 PROCEDURE — 99283 EMERGENCY DEPT VISIT LOW MDM: CPT

## 2024-04-15 NOTE — ED INITIAL ASSESSMENT (HPI)
Patient sts she woke up and hit the corner of her left eye on the nightstand and just had eye lid surgery 29th of feb. Denies visual changes.

## 2024-04-15 NOTE — ED PROVIDER NOTES
Patient Seen in: [facility] Emergency Department      History     Chief Complaint   Patient presents with    Laceration/Abrasion     Stated Complaint: LAC TO L EYE, HIT NIGHT STAND WHEN IN BED. RECENT EYELID SURGERY 6 WKS AGO    Subjective:   HPI    68-year-old female presenting to the emergency department for allergy injury.  Patient states that she was rolled over in bed and struck her left eyelid against the corner of a nightstand and suffered a laceration now presents emergency department back the end of February she did receive a eyelid surgery due to droop of her eyelids.  She denies any visual disturbance or visual loss head pain bony pain nasal congestion or any complaints    Objective:   Past Medical History:    Unspecified essential hypertension              Past Surgical History:   Procedure Laterality Date    Total abdom hysterectomy                  Social History     Socioeconomic History    Marital status: Single   Tobacco Use    Smoking status: Never    Smokeless tobacco: Never   Substance and Sexual Activity    Alcohol use: Yes    Drug use: No     Social Determinants of Health     Food Insecurity: No Food Insecurity (2/1/2024)    Food Insecurity     Food Insecurity: Never true   Transportation Needs: No Transportation Needs (2/1/2024)    Transportation Needs     Lack of Transportation: No   Housing Stability: Low Risk  (2/1/2024)    Housing Stability     Housing Instability: No              Review of Systems    Positive for stated complaint: LAC TO L EYE, HIT NIGHT STAND WHEN IN BED. RECENT EYELID SURGERY 6 WKS AGO  Other systems are as noted in HPI.  Constitutional and vital signs reviewed.      All other systems reviewed and negative except as noted above.    Physical Exam     ED Triage Vitals [04/14/24 2327]   /73   Pulse 66   Resp 18   Temp 97.1 °F (36.2 °C)   Temp src Temporal   SpO2 97 %   O2 Device None (Room air)       Current:/73   Pulse 66   Temp 97.1 °F (36.2 °C)  (Temporal)   Resp 18   Ht 172.7 cm (5' 8\")   Wt 65.8 kg   LMP 05/20/2013   SpO2 97%   BMI 22.05 kg/m²         Physical Exam  Awake alert patient appears no distress 3 cm laceration to the left upper eyelid as not a through and through laceration extraocular movements tachycardia visual fields intact extract the visual acuity noted visual fields intact no proptosis there is some edema to the eyelid itself no erythema pupils 3 mm and reactive no bony tenderness otherwise HEENT exam normal lungs normal cardiovascular exam normal abdomen normal no focal deficits extremities and is normal       ED Course   Labs Reviewed - No data to display          Differential diagnosis includes.eyelid laceration andGlobe perforation         MDM                                         Medical Decision Making  68-year-old female with eyelid laceration.  No signs of globe perforation no signs of entrapment no signs of bony tenderness indicative of an orbital fracture.  Patient has Dermabond placed approximate the wound edges adequately patient is no signs neurologic deficits patient will be discharged home with wound care instructions and to follow-up with her ophthalmologist  The patient was screened and evaluated during this visit.  As a treating physician attending to the patient, I determined, within reasonable clinical confidence and prior to discharge, that an emergency medical condition was not or was no longer present.  There was no indication for further evaluation, treatment or admission on an emergency basis.    The usual and customary discharge instructions were discussed given the patient's ER course.  We discussed signs and symptoms that should prompt the patient's immediate return to the emergency department.  Reasonable over-the-counter and prescription treatment options and physician follow-up plan was discussed.  Patient was discharged home in good condition  This note was prepared using [employer]  recognition dictation software.  As a result errors may occur.  When identified to these areas have been corrected.  While every attempt is made to correct errors during dictation discrepancies may still exist.  Please contact if there are any errors    Problems Addressed:  Left eyelid laceration, initial encounter: acute illness or injury    Amount and/or Complexity of Data Reviewed  ECG/medicine tests: ordered and independent interpretation performed. Decision-making details documented in ED Course.        Disposition and Plan     Clinical Impression:  1. Left eyelid laceration, initial encounter         Disposition:  There is no disposition on file for this visit.  There is no disposition time on file for this visit.    Follow-up:  Akash Yeh MD  [id number] [facility]  [address] [address]  [phone]    Follow up in 2 day(s)            Medications Prescribed:  Current Discharge Medication List

## 2025-03-13 ENCOUNTER — ORDER TRANSCRIPTION (OUTPATIENT)
Dept: PHYSICAL THERAPY | Facility: HOSPITAL | Age: 70
End: 2025-03-13

## 2025-03-13 DIAGNOSIS — M25.512 LEFT SHOULDER PAIN: Primary | ICD-10-CM

## 2025-03-28 ENCOUNTER — HOSPITAL ENCOUNTER (OUTPATIENT)
Dept: MRI IMAGING | Facility: HOSPITAL | Age: 70
Discharge: HOME | End: 2025-03-28
Attending: FAMILY MEDICINE
Payer: MEDICARE

## 2025-03-28 DIAGNOSIS — M79.671 PAIN IN RIGHT FOOT: ICD-10-CM

## 2025-03-28 PROCEDURE — 73718 MRI LOWER EXTREMITY W/O DYE: CPT | Performed by: FAMILY MEDICINE

## 2025-04-02 ENCOUNTER — TELEPHONE (OUTPATIENT)
Dept: PHYSICAL THERAPY | Facility: HOSPITAL | Age: 70
End: 2025-04-02

## 2025-04-09 ENCOUNTER — OFFICE VISIT (OUTPATIENT)
Dept: PHYSICAL THERAPY | Age: 70
End: 2025-04-09
Attending: FAMILY MEDICINE
Payer: MEDICARE

## 2025-04-09 DIAGNOSIS — M25.512 LEFT SHOULDER PAIN: Primary | ICD-10-CM

## 2025-04-09 PROCEDURE — 97110 THERAPEUTIC EXERCISES: CPT | Performed by: PHYSICAL THERAPIST

## 2025-04-09 PROCEDURE — 97161 PT EVAL LOW COMPLEX 20 MIN: CPT | Performed by: PHYSICAL THERAPIST

## 2025-04-09 NOTE — PROGRESS NOTES
UPPER EXTREMITY EVALUATION:     Diagnosis:   Left shoulder pain (M25.512) Patient:  Liz Cronin (69 year old, female)        Referring Provider: Myriam Fenton  Today's Date   4/9/2025    Precautions:      Date of Evaluation: 04/09/25  Next MD visit: 5/21/25  Date of Surgery: date of onset one year ago     PATIENT SUMMARY   Summary of chief complaints: L >R anterior shoulder pain, occ night time numbness  History of current condition: insidious onset L>R shoulder pain about a year ago   Pain level: current 2 /10, at best 2 /10, at worst 8 /10  Description of symptoms: dull, constant ache   Occupation: retired realtor   Leisure activities/Hobbies: daily exercise   Prior level of function: fully functional  Current limitations: difficulty lifting, doing overhead activities, sleep occ disturbed  Pt goals: pain free function L shoulder  Hand Dominance: right   Past medical history was reviewed with Liz.  Significant findings include:    Imaging/Tests: n/a   Liz  has a past medical history of Unspecified essential hypertension.  She  has a past surgical history that includes total abdom hysterectomy.    ASSESSMENT  Liz presents to physical therapy evaluation with primary c/o L >R anterior shoulder pain, occ night time numbness. The results of the objective tests and measures show very tight pecs L>R, generally tight with decreased capsular mobility bilaterally. Functional deficits include but are not limited to difficulty lifting, doing overhead activities, sleep occ disturbed. Signs and symptoms are consistent with diagnosis of Left shoulder pain (M25.512). Pt and PT discussed evaluation findings, pathology, POC and HEP.  Pt voiced understanding and performs HEP correctly without reported pain. Skilled Physical Therapy is medically necessary to address the above impairments and reach functional goals.  OBJECTIVE:    Musculoskeletal  Observation/Posture: forward head posture  Palpation:  tender along L>R pecs   Accessory motion:      Special Tests: Neg Spurlings     ROM and Strength (* denotes performed with pain)  Shoulder   ROM MMT (-/5)    R L R L     Flex 160 150 5 4+     Ext 30 30 5 5     Abd (C5) 150 140 5 4+     IR T9 T7 5 5     ER 60 50 5 4+     Low Trap n/a 4 4     Mid Trap n/a 4 4     SA n/a         ,   Elbow   ROM MMT (-/5)    R L R L     Flex (C6)     5 5     Ext (C7)     5 5     Pronation             Supination               Flexibility:  UE Flexibility R L     Upper Trap min restricted min restricted     Levator Scap min restricted min restricted     Pec Major significantly restricted significantly restricted     Scalenes         Latissimus min restricted min restricted     Bicep         LE Flexibility Other R L              Neurological:  Sensation: intact to light touch      Today's Treatment and Response:   Pt education was provided on exam findings, treatment diagnosis, treatment plan, expectations, and prognosis.  Today's Treatment       4/9/2025   UE Treatment   Therapeutic Exercise Prone thor ext x 20  Prone shoulder ext x 10  Seated thor ext in chair x 10  Pec doorway stretch 3x10\"  Seated shoulder flex x 10  Supine shoulder flex x 10     Manual Therapy Supine shoulder ret with OP x 1 min   Therapeutic Exercise Minutes 12   Manual Therapy Minutes 1   Evaluation Minutes 30   Total Time Of Timed Procedures 13   Total Time Of Service-Based Procedures 30   Total Treatment Time 43   HEP Prone thor and shoulder ext  Seated thor ext in chair  Seated and supine shoulder flex  Doorway pec stretch          Patient was instructed in and issued a HEP for: Prone thor and shoulder ext  Seated thor ext in chair  Seated and supine shoulder flex  Doorway pec stretch      Charges:  PT EVAL: Low Complexity,    In agreement with evaluation findings and clinical rationale, this evaluation involved LOW COMPLEXITY decision making due to no personal factors/comorbidities, 1-2 body structures  involved/activity limitations, and stable symptoms as documented in the evaluation.                                                          PLAN OF CARE:    Goals: (to be met in 10 visits)     Long term goals to be reached in 10 visits.  Pt. will report decreased pain from 8/10 to 2/10 at worst.  Increase active range of motion of left shoulder flex to 160 degrees to allow light overhead resistance training and functional reaching at home.    Increase strength of L shoulder to 5/5 to allow pt to perform equal activity with both arms and no pain.  LEISURE:  Pt will be able to return to previous level of function for leisure activities  Sleep not disturbed by pain  Pt. will be independent with home exercise program and self management.       Frequency / Duration: Patient will be seen 2x/week or a total of 10  visits over a 90 day period. Treatment will include: Manual Therapy; Therapeutic Exercise; Home Exercise Program instruction    Education or treatment limitation: None   Rehab Potential: excellent     QuickDASH Outcome Score  Score: (Patient-Rptd) 13.64 % (4/4/2025  6:03 AM)      Patient/Family/Caregiver was advised of these findings, precautions, and treatment options and has agreed to actively participate in planning and for this course of care.    Thank you for your referral. Please co-sign or sign and return this letter via fax as soon as possible to [phone]. If you have any questions, please contact me at Dept: [phone]    Sincerely,  Electronically signed by therapist: Talat Roche, PT, DPT, OCS, Cert MDT   Physician's certification required: Yes  I certify the need for these services furnished under this plan of treatment and while under my care.    X___________________________________________________ Date____________________    Certification From: 4/9/2025  To:7/8/2025

## 2025-04-21 ENCOUNTER — APPOINTMENT (OUTPATIENT)
Dept: PHYSICAL THERAPY | Age: 70
End: 2025-04-21
Attending: FAMILY MEDICINE
Payer: MEDICARE

## 2025-04-23 ENCOUNTER — OFFICE VISIT (OUTPATIENT)
Dept: PHYSICAL THERAPY | Age: 70
End: 2025-04-23
Attending: FAMILY MEDICINE
Payer: MEDICARE

## 2025-04-23 PROCEDURE — 97140 MANUAL THERAPY 1/> REGIONS: CPT | Performed by: PHYSICAL THERAPIST

## 2025-04-23 PROCEDURE — 97110 THERAPEUTIC EXERCISES: CPT | Performed by: PHYSICAL THERAPIST

## 2025-04-23 NOTE — PROGRESS NOTES
Patient: Liz Cronin (69 year old, female) Referring Provider:  Insurance:   Diagnosis: Left shoulder pain (M25.512) Myriam Fenton  [facility]   Date of Surgery: date of onset one year ago Next MD visit:  N/A   Precautions:    5/21/25 Referral Information:    Date of Evaluation: Req: 0, Auth: 0, Exp:     04/09/25 POC Auth Visits:  10       Today's Date   4/23/2025    Subjective  doing HEP, feels good, L shoulder still gets twinges with about the same intensity.       Pain: 4/10     Objective  reviewed HEP, progressed exercises to focus on improving pain free ROM and decreasing tension in pecs            Assessment  responds to treatment, L shoulder remains tighter than R, no sharp pains elicited.    Goals (to be met in 10 visits)   Long term goals to be reached in 10 visits.  Pt. will report decreased pain from 8/10 to 2/10 at worst.  Increase active range of motion of left shoulder flex to 160 degrees to allow light overhead resistance training and functional reaching at home.    Increase strength of L shoulder to 5/5 to allow pt to perform equal activity with both arms and no pain.  LEISURE:  Pt will be able to return to previous level of function for leisure activities  Sleep not disturbed by pain  Pt. will be independent with home exercise program and self management.           Plan  progress L shoulder functional reach and strength.    Treatment Last 4 Visits  Treatment Day: 2       4/9/2025 4/23/2025   UE Treatment   Therapeutic Exercise Prone thor ext x 20  Prone shoulder ext x 10  Seated thor ext in chair x 10  Pec doorway stretch 3x10\"  Seated shoulder flex x 10  Supine shoulder flex x 10   UBE 6 min, L3  Prone thor ext x 20  Prone shoulder ext x 10  Seated thor ext in chair x 10  Pec doorway stretch 3x10\"  Seated shoulder flex x 10  Supine shoulder flex x 10  Supine press, circles CW/CCW and flyes 3# x 30 ea     Manual Therapy Supine shoulder ret with OP x 1 min Man thor ext mobs gr 3 x 4  min  Man STM to L pecs x 4 min  L GHJ inf/post glides gr 3 x 4 min     Therapeutic Exercise Minutes 12 32   Manual Therapy Minutes 1 12   Evaluation Minutes 30    Total Time Of Timed Procedures 13 44   Total Time Of Service-Based Procedures 30 0   Total Treatment Time 43 44   HEP Prone thor and shoulder ext  Seated thor ext in chair  Seated and supine shoulder flex  Doorway pec stretch           HEP  Prone thor and shoulder ext  Seated thor ext in chair  Seated and supine shoulder flex  Doorway pec stretch      Charges  TE2, MT1

## 2025-04-28 ENCOUNTER — OFFICE VISIT (OUTPATIENT)
Dept: PHYSICAL THERAPY | Age: 70
End: 2025-04-28
Attending: FAMILY MEDICINE
Payer: MEDICARE

## 2025-04-28 PROCEDURE — 97110 THERAPEUTIC EXERCISES: CPT | Performed by: PHYSICAL THERAPIST

## 2025-04-28 PROCEDURE — 97140 MANUAL THERAPY 1/> REGIONS: CPT | Performed by: PHYSICAL THERAPIST

## 2025-04-28 NOTE — PROGRESS NOTES
Patient: Liz Cronin (69 year old, female) Referring Provider:  Insurance:   Diagnosis: Left shoulder pain (M25.512) Myriam Fenton  [employer] [facility]   Date of Surgery: date of onset one year ago Next MD visit:  N/A   Precautions:    5/21/25 Referral Information:    Date of Evaluation: Req: 0, Auth: 0, Exp:     04/09/25 POC Auth Visits:  10       Today's Date   4/28/2025    Subjective  doing HEP, feels good, L shoulder still gets twinges with about the same intensity. did some gardening and is generally sore after.       Pain: 4/10     Objective  reviewed HEP, progressed exercises to focus on improving pain free ROM and decreasing tension in pecs. L shoulder remains tighter into flex, but end range discomfort is decreasing.            Assessment  responds to treatment, L shoulder remains tighter than R, no sharp pains elicited. decreased end range pain after session.    Goals (to be met in 10 visits)   Long term goals to be reached in 10 visits.  Pt. will report decreased pain from 8/10 to 2/10 at worst.  Increase active range of motion of left shoulder flex to 160 degrees to allow light overhead resistance training and functional reaching at home.    Increase strength of L shoulder to 5/5 to allow pt to perform equal activity with both arms and no pain.  LEISURE:  Pt will be able to return to previous level of function for leisure activities  Sleep not disturbed by pain  Pt. will be independent with home exercise program and self management.               Plan  progress L shoulder functional reach and strength.    Treatment Last 4 Visits  Treatment Day: 3       4/9/2025 4/23/2025 4/28/2025   UE Treatment   Therapeutic Exercise Prone thor ext x 20  Prone shoulder ext x 10  Seated thor ext in chair x 10  Pec doorway stretch 3x10\"  Seated shoulder flex x 10  Supine shoulder flex x 10   UBE 6 min, L3  Prone thor ext x 20  Prone shoulder ext x 10  Seated thor ext in chair x 10  Pec doorway stretch 3x10\"  Seated  shoulder flex x 10  Supine shoulder flex x 10  Supine press, circles CW/CCW and flyes 3# x 30 ea   UBE 6 min, L4  Pulleys flex x 3 min  Prone thor ext x 20  Prone shoulder ext x 10  Seated thor ext in chair x 10  Pec doorway stretch 3x10\"  Seated shoulder flex x 10  Supine shoulder flex x 10  Supine press, circles CW/CCW and flyes 0# x 30 ea     Manual Therapy Supine shoulder ret with OP x 1 min Man thor ext mobs gr 3 x 4 min  Man STM to L pecs x 4 min  L GHJ inf/post glides gr 3 x 4 min   Man thor ext mobs gr 3 x 4 min  Man STM to L pecs x 4 min  L GHJ inf/post glides gr 3 x 4 min     Therapeutic Exercise Minutes 12 32 29   Manual Therapy Minutes 1 12 12   Evaluation Minutes 30     Total Time Of Timed Procedures 13 44 41   Total Time Of Service-Based Procedures 30 0 0   Total Treatment Time 43 44 41   HEP Prone thor and shoulder ext  Seated thor ext in chair  Seated and supine shoulder flex  Doorway pec stretch            HEP  Prone thor and shoulder ext  Seated thor ext in chair  Seated and supine shoulder flex  Doorway pec stretch      Charges  TE2, MT1

## 2025-04-30 ENCOUNTER — OFFICE VISIT (OUTPATIENT)
Dept: PHYSICAL THERAPY | Age: 70
End: 2025-04-30
Attending: FAMILY MEDICINE
Payer: MEDICARE

## 2025-04-30 PROCEDURE — 97110 THERAPEUTIC EXERCISES: CPT | Performed by: PHYSICAL THERAPIST

## 2025-04-30 PROCEDURE — 97140 MANUAL THERAPY 1/> REGIONS: CPT | Performed by: PHYSICAL THERAPIST

## 2025-04-30 NOTE — PROGRESS NOTES
Patient: Liz Cronin (69 year old, female) Referring Provider:  Insurance:   Diagnosis: Left shoulder pain (M25.512) Myriam Fenton  [employer] [facility]   Date of Surgery: date of onset one year ago Next MD visit:  N/A   Precautions:    5/21/25 Referral Information:    Date of Evaluation: Req: 0, Auth: 0, Exp:     04/09/25 POC Auth Visits:  10       Today's Date   4/30/2025    Subjective  doing HEP, feels good, L shoulder still gets twinges with about the same intensity. working out with , does not feel any specific movements/exercises provoke pain though L side feels a bit weaker than R       Pain: 3/10     Objective  added supine resisted ex, no pain provoked            Assessment  end range tightness continues L shoulder.    Goals (to be met in 10 visits)   Long term goals to be reached in 10 visits.  Pt. will report decreased pain from 8/10 to 2/10 at worst.  Increase active range of motion of left shoulder flex to 160 degrees to allow light overhead resistance training and functional reaching at home.    Increase strength of L shoulder to 5/5 to allow pt to perform equal activity with both arms and no pain.  LEISURE:  Pt will be able to return to previous level of function for leisure activities  Sleep not disturbed by pain  Pt. will be independent with home exercise program and self management.                   Plan  cont HEP, progress function and attempt to equalize strength, decrease tightness.    Treatment Last 4 Visits  Treatment Day: 4       4/9/2025 4/23/2025 4/28/2025 4/30/2025   UE Treatment   Therapeutic Exercise Prone thor ext x 20  Prone shoulder ext x 10  Seated thor ext in chair x 10  Pec doorway stretch 3x10\"  Seated shoulder flex x 10  Supine shoulder flex x 10   UBE 6 min, L3  Prone thor ext x 20  Prone shoulder ext x 10  Seated thor ext in chair x 10  Pec doorway stretch 3x10\"  Seated shoulder flex x 10  Supine shoulder flex x 10  Supine press, circles CW/CCW and flyes 3# x 30 ea    UBE 6 min, L4  Pulleys flex x 3 min  Prone thor ext x 20  Prone shoulder ext x 10  Seated thor ext in chair x 10  Pec doorway stretch 3x10\"  Seated shoulder flex x 10  Supine shoulder flex x 10  Supine press, circles CW/CCW and flyes 0# x 30 ea   UBE 6 min, L4  Pulleys flex x 3 min  Prone thor ext x 20  Prone shoulder ext x 10  Seated thor ext in chair x 10  Pec doorway stretch 3x10\"  Mil press 2# x 20  Supine press, circles CW/CCW and flyes 4# x 30 ea     Manual Therapy Supine shoulder ret with OP x 1 min Man thor ext mobs gr 3 x 4 min  Man STM to L pecs x 4 min  L GHJ inf/post glides gr 3 x 4 min   Man thor ext mobs gr 3 x 4 min  Man STM to L pecs x 4 min  L GHJ inf/post glides gr 3 x 4 min   Man thor ext mobs gr 3 x 4 min  Man STM to L pecs x 4 min  L GHJ inf/post glides gr 3 x 4 min     Therapeutic Exercise Minutes 12 32 29 30   Manual Therapy Minutes 1 12 12 12   Evaluation Minutes 30      Total Time Of Timed Procedures 13 44 41 42   Total Time Of Service-Based Procedures 30 0 0 0   Total Treatment Time 43 44 41 42   HEP Prone thor and shoulder ext  Seated thor ext in chair  Seated and supine shoulder flex  Doorway pec stretch             HEP  Prone thor and shoulder ext  Seated thor ext in chair  Seated and supine shoulder flex  Doorway pec stretch      Charges  TE2, MT1

## 2025-05-05 ENCOUNTER — OFFICE VISIT (OUTPATIENT)
Dept: PHYSICAL THERAPY | Age: 70
End: 2025-05-05
Attending: FAMILY MEDICINE
Payer: MEDICARE

## 2025-05-05 PROCEDURE — 97140 MANUAL THERAPY 1/> REGIONS: CPT | Performed by: PHYSICAL THERAPIST

## 2025-05-05 PROCEDURE — 97110 THERAPEUTIC EXERCISES: CPT | Performed by: PHYSICAL THERAPIST

## 2025-05-05 NOTE — PROGRESS NOTES
Patient: Liz Cronin (69 year old, female) Referring Provider:  Insurance:   Diagnosis: Left shoulder pain (M25.512) Myriam Fenton  [employer] [facility]   Date of Surgery: No data recorded Next MD visit:  N/A   Precautions:    No data recorded Referral Information:    Date of Evaluation: Req: 0, Auth: 0, Exp:     No data recorded POC Auth Visits:  10       Today's Date   5/5/2025    Subjective  generally sore from gardening yesterday.       Pain: 2/10     Objective  treatment focused on ROM today, did not pregress resistance. supine shoulder flex = 160 degrees, standing = 150 degrees            Assessment  end range tightness continues L shoulder.    Goals (to be met in 10 visits)   Long term goals to be reached in 10 visits.  Pt. will report decreased pain from 8/10 to 2/10 at worst.  Increase active range of motion of left shoulder flex to 160 degrees to allow light overhead resistance training and functional reaching at home.    Increase strength of L shoulder to 5/5 to allow pt to perform equal activity with both arms and no pain.  LEISURE:  Pt will be able to return to previous level of function for leisure activities  Sleep not disturbed by pain  Pt. will be independent with home exercise program and self management.                       Plan  cont HEP, progress function and attempt to equalize strength, decrease tightness.    Treatment Last 4 Visits  Treatment Day: 5       4/23/2025 4/28/2025 4/30/2025 5/5/2025   UE Treatment   Therapeutic Exercise UBE 6 min, L3  Prone thor ext x 20  Prone shoulder ext x 10  Seated thor ext in chair x 10  Pec doorway stretch 3x10\"  Seated shoulder flex x 10  Supine shoulder flex x 10  Supine press, circles CW/CCW and flyes 3# x 30 ea   UBE 6 min, L4  Pulleys flex x 3 min  Prone thor ext x 20  Prone shoulder ext x 10  Seated thor ext in chair x 10  Pec doorway stretch 3x10\"  Seated shoulder flex x 10  Supine shoulder flex x 10  Supine press, circles CW/CCW and flyes 0# x 30  ea   UBE 6 min, L4  Pulleys flex x 3 min  Prone thor ext x 20  Prone shoulder ext x 10  Seated thor ext in chair x 10  Pec doorway stretch 3x10\"  Mil press 2# x 20  Supine press, circles CW/CCW and flyes 4# x 30 ea   UBE 6 min, L4  Pulleys flex x 3 min  Prone thor ext x 20  Prone shoulder ext x 10  Seated thor ext in chair x 10  Pec doorway stretch 3x10\"  Mil press 2# x 20  Supine press, circles CW/CCW and flyes 4# x 30 ea     Manual Therapy Man thor ext mobs gr 3 x 4 min  Man STM to L pecs x 4 min  L GHJ inf/post glides gr 3 x 4 min   Man thor ext mobs gr 3 x 4 min  Man STM to L pecs x 4 min  L GHJ inf/post glides gr 3 x 4 min   Man thor ext mobs gr 3 x 4 min  Man STM to L pecs x 4 min  L GHJ inf/post glides gr 3 x 4 min   Man thor ext mobs gr 3 x 4 min  Man STM to L pecs x 4 min  L GHJ inf/post glides gr 3 x 4 min  UT stretch x 2 min ea     Therapeutic Exercise Minutes 32 29 30 28   Manual Therapy Minutes 12 12 12 16   Total Time Of Timed Procedures 44 41 42 44   Total Time Of Service-Based Procedures 0 0 0 0   Total Treatment Time 44 41 42 44        HEP  Prone thor and shoulder ext  Seated thor ext in chair  Seated and supine shoulder flex  Doorway pec stretch      Charges  TE2, MT1

## 2025-05-07 ENCOUNTER — APPOINTMENT (OUTPATIENT)
Dept: PHYSICAL THERAPY | Age: 70
End: 2025-05-07
Attending: FAMILY MEDICINE
Payer: MEDICARE

## 2025-05-12 ENCOUNTER — OFFICE VISIT (OUTPATIENT)
Dept: PHYSICAL THERAPY | Age: 70
End: 2025-05-12
Attending: FAMILY MEDICINE
Payer: MEDICARE

## 2025-05-12 PROCEDURE — 97140 MANUAL THERAPY 1/> REGIONS: CPT | Performed by: PHYSICAL THERAPIST

## 2025-05-12 PROCEDURE — 97110 THERAPEUTIC EXERCISES: CPT | Performed by: PHYSICAL THERAPIST

## 2025-05-12 NOTE — PROGRESS NOTES
Patient: Liz Cronin (69 year old, female) Referring Provider:  Insurance:   Diagnosis: Left shoulder pain (M25.512) Myriam Fenton  [employer] [facility]   Date of Surgery: No data recorded Next MD visit:  N/A   Precautions:    No data recorded Referral Information:    Date of Evaluation: Req: 0, Auth: 0, Exp:     No data recorded POC Auth Visits:  10       Today's Date   5/12/2025    Subjective  L shoulder feels more flexible with greater ease of movement, however it is still painful at L lateral pectoral area.       Pain: 1/10     Objective  progressed treatment with added reps and cervical mobs to C/T junction. L shoulder flex= 160 degrees.            Assessment  pt improving in function and ROM, though subjective pain complaints continue.    Goals (to be met in 10 visits)   Long term goals to be reached in 10 visits.  Pt. will report decreased pain from 8/10 to 2/10 at worst.  Increase active range of motion of left shoulder flex to 160 degrees to allow light overhead resistance training and functional reaching at home.    Increase strength of L shoulder to 5/5 to allow pt to perform equal activity with both arms and no pain.  LEISURE:  Pt will be able to return to previous level of function for leisure activities  Sleep not disturbed by pain  Pt. will be independent with home exercise program and self management.                           Plan  If no improvement in pain levels by next visit, consider referring  back to DO for further work up to rule out other medical causes for pain.    Treatment Last 4 Visits  Treatment Day: 6       4/28/2025 4/30/2025 5/5/2025 5/12/2025   UE Treatment   Therapeutic Exercise UBE 6 min, L4  Pulleys flex x 3 min  Prone thor ext x 20  Prone shoulder ext x 10  Seated thor ext in chair x 10  Pec doorway stretch 3x10\"  Seated shoulder flex x 10  Supine shoulder flex x 10  Supine press, circles CW/CCW and flyes 0# x 30 ea   UBE 6 min, L4  Pulleys flex x 3 min  Prone thor ext x  20  Prone shoulder ext x 10  Seated thor ext in chair x 10  Pec doorway stretch 3x10\"  Mil press 2# x 20  Supine press, circles CW/CCW and flyes 4# x 30 ea   UBE 6 min, L4  Pulleys flex x 3 min  Prone thor ext x 20  Prone shoulder ext x 10  Seated thor ext in chair x 10  Pec doorway stretch 3x10\"  Mil press 2# x 20  Supine press, circles CW/CCW and flyes 4# x 30 ea   UBE 6 min, L4  Pulleys flex x 3 min  Prone thor ext x 20  Prone shoulder ext x 20    Pec doorway stretch 3x10\"  Mil press 2# x 20  Supine press, circles CW/CCW and flyes 4# x 30 ea     Manual Therapy Man thor ext mobs gr 3 x 4 min  Man STM to L pecs x 4 min  L GHJ inf/post glides gr 3 x 4 min   Man thor ext mobs gr 3 x 4 min  Man STM to L pecs x 4 min  L GHJ inf/post glides gr 3 x 4 min   Man thor ext mobs gr 3 x 4 min  Man STM to L pecs x 4 min  L GHJ inf/post glides gr 3 x 4 min  UT stretch x 2 min ea   Man thor ext mobs gr 3 x 4 min  Man STM to L pecs x 4 min  L GHJ inf/post glides gr 3 x 2 min  Cervical lateral mobs to improve motion at C/T junction x 2 min  UT stretch x 2 min ea     Therapeutic Exercise Minutes 29 30 28 30   Manual Therapy Minutes 12 12 16 16   Total Time Of Timed Procedures 41 42 44 46   Total Time Of Service-Based Procedures 0 0 0 0   Total Treatment Time 41 42 44 46        HEP  Prone thor and shoulder ext  Seated thor ext in chair  Seated and supine shoulder flex  Doorway pec stretch      Charges  TE2, MT1

## 2025-05-14 ENCOUNTER — OFFICE VISIT (OUTPATIENT)
Dept: PHYSICAL THERAPY | Age: 70
End: 2025-05-14
Attending: FAMILY MEDICINE
Payer: MEDICARE

## 2025-05-14 PROCEDURE — 97110 THERAPEUTIC EXERCISES: CPT | Performed by: PHYSICAL THERAPIST

## 2025-05-14 PROCEDURE — 97140 MANUAL THERAPY 1/> REGIONS: CPT | Performed by: PHYSICAL THERAPIST

## 2025-05-14 NOTE — PROGRESS NOTES
Patient: Liz Cronin (69 year old, female) Referring Provider:  Insurance:   Diagnosis: Left shoulder pain (M25.512) Myriam Fenton  [employer] MCR   Date of Surgery: No data recorded Next MD visit:  N/A   Precautions:    No data recorded Referral Information:    Date of Evaluation: Req: 0, Auth: 0, Exp:     No data recorded POC Auth Visits:  10     PROGRESS NOTE  Today's Date   5/14/2025    Subjective  L shoulder feels more flexible with greater ease of movement, however it is still painful at L lateral pectoral area with L shoulder/pec stretch.       Pain: 1/10     Objective  progressed treatment with added resistance and pectoral mobs with shoulder in abd/extn. L shoulder flex= 160 degrees.            Assessment  pt improving in function and ROM, though subjective pain complaints continue.    QuickDASH Outcome Score  Score: (Patient-Rptd) 13.64 % (4/4/2025  6:03 AM)    Post QuickDASH Outcome Score  Post Score: (Patient-Rptd) 15.91 % (5/14/2025  1:17 PM)    -2.27 % improvement     Goals (to be met in 10 visits)   Long term goals to be reached in 10 visits.  Pt. will report decreased pain from 8/10 to 2/10 at worst. - mostly met 5/14/25  Increase active range of motion of left shoulder flex to 160 degrees to allow light overhead resistance training and functional reaching at home.  - MET 5/14/25  Increase strength of L shoulder to 5/5 to allow pt to perform equal activity with both arms and no pain.- MET for MMT 5/14/25  LEISURE:  Pt will be able to return to previous level of function for leisure activities- mostly met 5/14/25  Sleep not disturbed by pain- progressing 5/14/25  Pt. will be independent with home exercise program and self management.- MET 5/14/25                               Plan  pt to return to DO for follow up. Hold PT until then.    Treatment Last 4 Visits  Treatment Day: 7       4/30/2025 5/5/2025 5/12/2025 5/14/2025   UE Treatment   Therapeutic Exercise UBE 6 min, L4  Pulleys flex x 3  min  Prone thor ext x 20  Prone shoulder ext x 10  Seated thor ext in chair x 10  Pec doorway stretch 3x10\"  Mil press 2# x 20  Supine press, circles CW/CCW and flyes 4# x 30 ea   UBE 6 min, L4  Pulleys flex x 3 min  Prone thor ext x 20  Prone shoulder ext x 10  Seated thor ext in chair x 10  Pec doorway stretch 3x10\"  Mil press 2# x 20  Supine press, circles CW/CCW and flyes 4# x 30 ea   UBE 6 min, L4  Pulleys flex x 3 min  Prone thor ext x 20  Prone shoulder ext x 20    Pec doorway stretch 3x10\"  Mil press 2# x 20  Supine press, circles CW/CCW and flyes 4# x 30 ea   UBE 6 min, L4  Pulleys flex x 3 min  Prone thor ext x 20  Prone shoulder ext x 20     Pec doorway stretch 3x10\"  Bicep curl/Mil press 5# x 20  Supine press, circles CW/CCW and flyes 5# x 30 ea     Manual Therapy Man thor ext mobs gr 3 x 4 min  Man STM to L pecs x 4 min  L GHJ inf/post glides gr 3 x 4 min   Man thor ext mobs gr 3 x 4 min  Man STM to L pecs x 4 min  L GHJ inf/post glides gr 3 x 4 min  UT stretch x 2 min ea   Man thor ext mobs gr 3 x 4 min  Man STM to L pecs x 4 min  L GHJ inf/post glides gr 3 x 2 min  Cervical lateral mobs to improve motion at C/T junction x 2 min  UT stretch x 2 min ea   Man thor ext mobs gr 3 x 4 min  Man STM to L pecs x 4 min  L GHJ inf/post glides gr 3 x 2 min  Cervical lateral mobs to improve motion at C/T junction x 2 min  UT stretch x 2 min ea     Therapeutic Exercise Minutes 30 28 30 28   Manual Therapy Minutes 12 16 16 16   Total Time Of Timed Procedures 42 44 46 44   Total Time Of Service-Based Procedures 0 0 0 0   Total Treatment Time 42 44 46 44        HEP  Prone thor and shoulder ext  Seated thor ext in chair  Seated and supine shoulder flex  Doorway pec stretch      Charges  TE2, MT1

## (undated) NOTE — LETTER
Patient Information:  Patient Name:   Address: [place]berg, [phone] [facility] [address]  [phone] (home)  Sex: female          : 1955   ________________________________________________________________  Referral Information:  Order Date: 2023  Expected Date: 2023  Expiration Date: 2024 Epic Order #: [id number]   Referral Type: MRI ANKLE, LEFT (CPT=73721) Dx: Left ankle pain, unspecified chronicity (M25.572)  Posterior tibial tendon dysfunction (R02.810)   Signed Referral [place]:     Release to patient: Immediate  Comments: External location  Number of Visits: 1     _____________________________________________________        Coverage Information:      Primary Visit Coverage        Payer Plan Sponsor Code Group Number Group Name     [id number] SINDY Mera [facility] DIVISION [address] [place] [address]   [id number]                        Primary Visit Coverage Subscriber        ID Name [facility] Address     [id number] [facility] [id number] [address] [place] [address]           [address], [address]                      Secondary Visit Coverage        Payer Plan Sponsor Code Group Number Group Name     [address] [facility]   [id number]                        Secondary Visit Coverage Subscriber        ID Name [facility] Address     [id number] [address] [id number] [facility]           [address] [facility], [address] [place], [place] [address]                 SIGNED [address] Provider: Camila Catherine DPM   Date: 2023 at 10:28 AM  Ordering Department: [facility] [place] [facility] [place]